# Patient Record
Sex: FEMALE | Race: BLACK OR AFRICAN AMERICAN | Employment: FULL TIME | URBAN - METROPOLITAN AREA
[De-identification: names, ages, dates, MRNs, and addresses within clinical notes are randomized per-mention and may not be internally consistent; named-entity substitution may affect disease eponyms.]

---

## 2020-12-21 ENCOUNTER — TRANSCRIBE ORDER (OUTPATIENT)
Dept: REGISTRATION | Age: 60
End: 2020-12-21

## 2020-12-21 ENCOUNTER — HOSPITAL ENCOUNTER (OUTPATIENT)
Dept: GENERAL RADIOLOGY | Age: 60
Discharge: HOME | End: 2020-12-21
Payer: COMMERCIAL

## 2020-12-21 DIAGNOSIS — M25.511 RIGHT SHOULDER PAIN: ICD-10-CM

## 2020-12-21 DIAGNOSIS — M25.511 RIGHT SHOULDER PAIN: Primary | ICD-10-CM

## 2020-12-21 PROCEDURE — 73030 X-RAY EXAM OF SHOULDER: CPT

## 2021-02-15 ENCOUNTER — HOSPITAL ENCOUNTER (OUTPATIENT)
Dept: PHYSICAL THERAPY | Age: 61
Discharge: HOME | End: 2021-02-15
Payer: COMMERCIAL

## 2021-02-15 PROCEDURE — 97110 THERAPEUTIC EXERCISES: CPT

## 2021-02-15 PROCEDURE — 97162 PT EVAL MOD COMPLEX 30 MIN: CPT

## 2021-02-15 PROCEDURE — 97140 MANUAL THERAPY 1/> REGIONS: CPT

## 2021-02-15 NOTE — PROGRESS NOTES
PT DAILY TREATMENT NOTE     Patient Name: Izaiah Corona [facility]  Date:2/15/2021  : 1960  [x]  Patient  Verified  Payor: Marilu Julien / Plan: [place] [employer] / Product Type: PPO /    In time:426pm  Out time:515pm  Total Treatment Time (min): 44  Visit #: 1 of     Medicare/BCBS Only   Total Timed Codes (min):  na 1:1 Treatment Time:  na       Treatment Area: Chronic right shoulder pain [M25.511, G89.29]  SUBJECTIVE  Pain Level (0-10 scale): 4  Any medication changes, allergies to medications, adverse drug reactions, diagnosis change, or new procedure performed?: [x] No    [] Yes (see summary sheet for update)  Subjective functional status/changes:   [] No changes reported  Pt initial eval see POC for details    OBJECTIVE    Modality rationale: decrease pain to improve the patient's ability to tolerate ADLs   Min Type Additional Details    [] Estim:  []Unatt       []IFC  []Premod                        []Other:  []w/ice   []w/heat  Position:  Location:    [] Estim: []Att    []TENS instruct  []NMES                    []Other:  []w/US   []w/ice   []w/heat  Position:  Location:    []  Traction: [] Cervical       []Lumbar                       [] Prone          []Supine                       []Intermittent   []Continuous Lbs:  [] before manual  [] after manual    []  Ultrasound: []Continuous   [] Pulsed                           []1MHz   []3MHz W/cm2:  Location:    []  Iontophoresis with dexamethasone         Location: [] Take home patch   [] In clinic   3 [x]  Ice     []  heat  []  Ice massage  []  Laser   []  Anodyne Position:supine   Location:r shoulder    []  Laser with stim  []  Other:  Position:  Location:    []  Vasopneumatic Device Pressure:       [] lo [] med [] hi   Temperature: [] lo [] med [] hi   [] Skin assessment post-treatment:  []intact []redness- no adverse reaction    []redness  adverse reaction:     18 min [x]Eval                  []Re-Eval       10 min Therapeutic Exercise:  [x] See flow sheet : Rationale: increase ROM and increase strength to improve the patient's ability to lift and reach overhead    13 min Manual Therapy:  Gr II/III inferior glide, manual stretching in all planes, stm to upper trap, deltoid, distal pecs, biceps   The manual therapy interventions were performed at a separate and distinct time from the therapeutic activities interventions. Rationale: decrease pain, increase ROM and increase tissue extensibility to improve tissue excursion during functional mobility and ADLs          With   [] TE   [] TA   [] neuro   [] other: Patient Education: [x] Review HEP    [] Progressed/Changed HEP based on:   [] positioning   [] body mechanics   [] transfers   [] heat/ice application    [] other:      Other Objective/Functional Measures:    Justification for Eval Code Complexity:  Patient History : see POC   Examination see exam   Clinical Presentation: evolving  Clinical Decision Making : FOTO : 44/100    Pain Level (0-10 scale) post treatment: 4    ASSESSMENT/Changes in Function: Pt was instructed in initial HEP required demo, vc, and tc for all exercises to perform correctly. Pt was given hand out detailing exercises and instructed in modification of exercises to tolerance, and in performing exercises safely. Pt verbalized understanding. Patient will continue to benefit from skilled PT services to modify and progress therapeutic interventions, address functional mobility deficits, address ROM deficits, address strength deficits, analyze and address soft tissue restrictions, analyze and cue movement patterns, analyze and modify body mechanics/ergonomics, assess and modify postural abnormalities, address imbalance/dizziness and instruct in home and community integration to attain remaining goals.      [x]  See Plan of Care  []  See progress note/recertification  []  See Discharge Summary         Progress towards goals / Updated goals:  No progress as goals were set today    PLAN  [] Upgrade activities as tolerated     []  Continue plan of care  []  Update interventions per flow sheet       []  Discharge due to:_  []  Other:_        Laxmi Prado 2/15/2021  12:13 PM    Future Appointments   Date Time Provider Eveline Mreida   2/15/2021  4:15 PM Gera Isbell [facility]

## 2021-02-15 NOTE — PROGRESS NOTES
[address] [facility] [address] [facility] AT [address]  Phone: [phone] Fax: [phone] / [address] [address] PHYSICAL THERAPY SERVICES  Patient Name: Garett Peguero : 1960   Medical   Diagnosis: Chronic right shoulder pain [M25.511, G89.29] Treatment Diagnosis: Chronic right shoulder pain [M25.511, G89.29]   Onset Date: 2020     Referral Source: Fransico Mcleod MD [facility]): 2/15/2021   Prior Hospitalization: See medical history Provider #: [id number]   Prior Level of Function: I in all ADLs/funcitonal mobility. Able to reach/lift overhead with no pain or restriction. Able to sleep through the night   Comorbidities: Diabetes, OA, HTN   Medications: Verified on Patient Summary List   The Plan of Care and following information is based on the information from the initial evaluation.   ========================================================================  Assessment / key information:  Pt is a 62 y.o. F who presents with R shoulder pain. Signs and sx are consistent with adhesive capsulitis. Current deficits include: significant ROM restrictions, dec functional strength, dec dynamic shoulder stability. Functional deficits include: impaired reaching/lifting, dressing, sleeping. FOTO score indicates 56% functional impairment. Home exercise program initiated on initial evaluation to address these deficits. Pt would benefit from PT to address these deficits for increased functional mobility and QOL. KYA: Pt reports that her shoulder began hurting in , she saw her MD in July and was prescribed ibuprofen, however the shoulder continued to get worse. By [facility] time this year she was not able to lift her arm any more. She had an x ray done back in December which she reports showed arthritis, and she was sent to Dr. Leann Lou who did an MRI which she reports showed adhesive capsulitis.  She also had a cortisone injection in January which did not help, tramadol helps so she takes it at night and was recommended to take it before therapy. The pain in her shoulder goes from the shoulder to the forearm. It wakes her 3-4 times per night, she is not able to reach or lift with the R arm, getting dressed is very difficult as is cooking and all other ADLs. PAIN:  Location- R shoulder  Current- 4  Best-2  Worst- 10  Alleviating factors: medication  Aggravating factors: all activity    OBJECTIVE    AROM: shoulder L 160 flex, 160 abd, PERI to T4, FIR to T10  Shoulder R 45 flex, 45 abd, PERI unable, FIR to greater trochanter.  Pain in all directions    MMT: shoulder deferred due to high pain and significant ROM restrictions    Outcome Measure: FOTO= 44    Posture: maikel elevated shoulders, mild forward head rounded shoulders, R arm held in protective adduction, IR, elbow flexion    Special Tests: unable to assume test position, defered    Accessory ROM:  Inferior glide- maximum restriction  Anterior glide-maximum restriction  Posterior glide-maximum restriction    Palpation for tenderness: TTP of deltoid, distal pectorals, infraspinatus, supraspinatus, sub acromial space, upper traps, biceps, triceps,     ========================================================================  Eval Complexity: History: HIGH Complexity :3+ comorbidities / personal factors will impact the outcome/ POC Exam:MEDIUM Complexity : 3 Standardized tests and measures addressing body structure, function, activity limitation and / or participation in recreation  Presentation: MEDIUM Complexity : Evolving with changing characteristics  Clinical Decision Making:MEDIUM Complexity : FOTO score of 26-74Overall Complexity:MEDIUM  Problem List: pain affecting function, decrease ROM, decrease strength, edema affecting function, impaired gait/ balance, decrease ADL/ functional abilitiies, decrease activity tolerance, decrease flexibility/ joint mobility and decrease transfer abilities   Treatment Plan may include any combination of the following: Therapeutic exercise, Therapeutic activities, Neuromuscular re-education, Physical agent/modality, Gait/balance training, Manual therapy, Patient education, Self Care training, Functional mobility training, Home safety training and Stair training  Patient / Family readiness to learn indicated by: asking questions  Persons(s) to be included in education: patient (P)  Barriers to Learning/Limitations: None  Measures taken:    Patient Goal (s): \"movement w/o pain\"   Patient self reported health status: fair  Rehabilitation Potential: good    Short Term Goals: To be accomplished in 1 weeks:  1) Goal: Patient will be independent and compliant with HEP in order to progress toward long term goals. Status at last note/certification: issued and reviewed  Long Term Goals: To be accomplished in 8-12 treatments:  1) Goal: Patient will improve FOTO assessment score to 63 pts in order to indicate improved functional abilities. Status at last note/certification: 44 pts  2) Goal: Patient will improve R shoulder flexion to 150 deg or better for overhead reaching  Status at last note/certification: 45 deg  3) Goal: Patient will report worst shoulder pain as 4/10 or less in order to progress toward personal goals. Status at last note/certification: 27/69  4) Goal: Patient will report overall at least 75% improvement in function in order to progress toward premorbid status.   Status at last note/certification: n/a  6) Goal: Patient will report ability to sleep through the night waking 1x or less for sufficient rest  Status at last note/certification: wakes 3-4 times per night    Frequency / Duration:   Patient to be seen  1-2  times per week for 4-6  weeks:  Patient / Caregiver education and instruction: exercises  Therapist Signature: Sandie Sprague Date: 5/37/2072   Certification Period: na Time: 12:13 PM ========================================================================  I certify that the above Physical Therapy Services are being furnished while the patient is under my care. I agree with the treatment plan and certify that this therapy is necessary. Physician Signature:        Date:       Time: ___                                            Leelee Vasquez MD.    Please sign and return to [facility] or you may fax the signed copy to [phone]. Thank you.

## 2021-02-24 ENCOUNTER — HOSPITAL ENCOUNTER (OUTPATIENT)
Dept: PHYSICAL THERAPY | Age: 61
Discharge: HOME | End: 2021-02-24
Payer: COMMERCIAL

## 2021-02-24 PROCEDURE — 97530 THERAPEUTIC ACTIVITIES: CPT

## 2021-02-24 PROCEDURE — 97140 MANUAL THERAPY 1/> REGIONS: CPT

## 2021-02-24 PROCEDURE — 97110 THERAPEUTIC EXERCISES: CPT

## 2021-02-24 NOTE — PROGRESS NOTES
PT DAILY TREATMENT NOTE     Patient Name: Zoie Cool  Date:2021  : 1960  [x]  Patient  Verified  Payor: Nichole Vaca / Plan: [place] [employer] PPO / Product Type: PPO /    In time:330p  Out time:223pm  Total Treatment Time (min): 53  Visit #: 2 of     Medicare/BCBS Only   Total Timed Codes (min):  43 1:1 Treatment Time:  43       Treatment Area: Chronic right shoulder pain [M25.511, G89.29]    SUBJECTIVE  Pain Level (0-10 scale): 6  Any medication changes, allergies to medications, adverse drug reactions, diagnosis change, or new procedure performed?: [x] No    [] Yes (see summary sheet for update)  Subjective functional status/changes:   [] No changes reported  Pt reports she has been doing her HEP, she took the last couple days off due to being sore from having the covid vaccine in that arm.      OBJECTIVE    Modality rationale: decrease inflammation and decrease pain to improve the patients ability to tolerate ADLs   Min Type Additional Details    [] Estim:  []Unatt       []IFC  []Premod                        []Other:  []w/ice   []w/heat  Position:  Location:    [] Estim: []Att    []TENS instruct  []NMES                    []Other:  []w/US   []w/ice   []w/heat  Position:  Location:    []  Traction: [] Cervical       []Lumbar                       [] Prone          []Supine                       []Intermittent   []Continuous Lbs:  [] before manual  [] after manual    []  Ultrasound: []Continuous   [] Pulsed                           []1MHz   []3MHz W/cm2:  Location:    []  Iontophoresis with dexamethasone         Location: [] Take home patch   [] In clinic   10 [x]  Ice     []  heat  []  Ice massage  []  Laser   []  Anodyne Position: supine   Location: R shoulder    []  Laser with stim  []  Other:  Position:  Location:    []  Vasopneumatic Device Pressure:       [] lo [] med [] hi   Temperature: [] lo [] med [] hi   [] Skin assessment post-treatment:  []intact []redness- no adverse reaction []redness  adverse reaction:     25 min Therapeutic Exercise:  [x] See flow sheet :   Rationale: increase ROM and increase strength to improve the patients ability to reach and lift overhead    8 min Therapeutic Activity:  [x]  See flow sheet :   Rationale: increase strength and improve coordination  to improve the patients ability to perform dressing and home care     10 min Manual Therapy:  Gr II inferior glides, gentle PROM in all planes, stm to upper trap, deltoid, infraspinatus,    The manual therapy interventions were performed at a separate and distinct time from the therapeutic activities interventions. Rationale: increase ROM and increase tissue extensibility to improve tissue excursion during functional activities           With   [] TE   [] TA   [] neuro   [] other: Patient Education: [x] Review HEP    [] Progressed/Changed HEP based on:   [] positioning   [] body mechanics   [] transfers   [] heat/ice application    [] other:      Other Objective/Functional Measures: program initiated today -1st follow up     Pain Level (0-10 scale) post treatment: 9.5    ASSESSMENT/Changes in Function: Pt required vc and demo with all new exercises to perform correctly. Pt reported pain with all exercises, and visible distress. Pt was verbally questioned to tolerance of exercises, she reported she was\"just being a baby\" and that she could continue. Pt was educated in importance of modifying exercises to tolerance to avoid exacerbating pain. Pt verbalized understanding. Pt had fair tolerance to manual therapy due to high level of pain today, PT plans to apply MHP prior to exercise NV, and try estim with ice following tx to address pain. Pt was instructed to ice/heat at home as needed to manage pain.      Patient will continue to benefit from skilled PT services to modify and progress therapeutic interventions, address functional mobility deficits, address ROM deficits, address strength deficits, analyze and address soft tissue restrictions, analyze and cue movement patterns, analyze and modify body mechanics/ergonomics, assess and modify postural abnormalities, address imbalance/dizziness and instruct in home and community integration to attain remaining goals. []  See Plan of Care  []  See progress note/recertification  []  See Discharge Summary         Progress towards goals / Updated goals:     Short Term Goals: To be accomplished in 1 weeks:  1) Goal: Patient will be independent and compliant with HEP in order to progress toward long term goals. Status at last note/certification: issued and reviewed Progressing, pt has performed 1x at home 2/24/21  Long Term Goals: To be accomplished in 8-12 treatments:  1) Goal: Patient will improve FOTO assessment score to 63 pts in order to indicate improved functional abilities. Status at last note/certification: 44 pts  2) Goal: Patient will improve R shoulder flexion to 150 deg or better for overhead reaching  Status at last note/certification: 45 deg  3) Goal: Patient will report worst shoulder pain as 4/10 or less in order to progress toward personal goals. Status at last note/certification: 94/97  4) Goal: Patient will report overall at least 75% improvement in function in order to progress toward premorbid status.   Status at last note/certification: n/a  6) Goal: Patient will report ability to sleep through the night waking 1x or less for sufficient rest  Status at last note/certification: wakes 3-4 times per night       PLAN  []  Upgrade activities as tolerated     []  Continue plan of care  []  Update interventions per flow sheet       []  Discharge due to:_  []  Other:_      Panfilo Favorite 2/24/2021  9:51 AM    Future Appointments   Date Time Provider Eveline Merida   2/24/2021  3:30 PM Barbara Roberts [facility]   2/25/2021  4:15 PM [facility]   3/2/2021  5:45 PM [facility]   3/4/2021  4:15 PM [facility]   3/9/2021  5:45 PM [facility] PT [facility] [address]   3/11/2021  4:15 PM [address] PT [facility] [address]   3/16/2021  4:15 PM [address] PT [facility] [address]   3/18/2021  4:15 PM [address] PT [facility] [address]

## 2021-02-25 ENCOUNTER — HOSPITAL ENCOUNTER (OUTPATIENT)
Dept: PHYSICAL THERAPY | Age: 61
Discharge: HOME | End: 2021-02-25
Payer: COMMERCIAL

## 2021-02-25 PROCEDURE — 97110 THERAPEUTIC EXERCISES: CPT | Performed by: GENERAL ACUTE CARE HOSPITAL

## 2021-02-25 PROCEDURE — 97014 ELECTRIC STIMULATION THERAPY: CPT | Performed by: GENERAL ACUTE CARE HOSPITAL

## 2021-02-25 PROCEDURE — 97140 MANUAL THERAPY 1/> REGIONS: CPT | Performed by: GENERAL ACUTE CARE HOSPITAL

## 2021-02-25 PROCEDURE — 97530 THERAPEUTIC ACTIVITIES: CPT | Performed by: GENERAL ACUTE CARE HOSPITAL

## 2021-02-25 NOTE — PROGRESS NOTES
PT DAILY TREATMENT NOTE     Patient Name: Laura Haines [facility]  Date:2021  : 1960  [x]  Patient  Verified  Payor: Nichole Vaca / Plan: [place] [employer] PPO / Product Type: PPO /    In time: 4:16  Out time: 5:25  Total Treatment Time (min): 69  Visit #: 3 of     Medicare/BCBS Only   Total Timed Codes (min):  53 1:1 Treatment Time:  53       Treatment Area: Chronic right shoulder pain [M25.511, G89.29]    SUBJECTIVE  Pain Level (0-10 scale): 8/10  Any medication changes, allergies to medications, adverse drug reactions, diagnosis change, or new procedure performed?: [x] No    [] Yes (see summary sheet for update)  Subjective functional status/changes:   [] No changes reported   Pt reports increased pain today. \"I was just here yesterday, so I don't think my shoulder has had time to recover. \"     OBJECTIVE    Modality rationale: decrease inflammation and decrease pain to improve the patients ability to tolerate ADLs   Min Type Additional Details   10 [x] Estim:  [x]Unatt       [x]IFC  []Premod                        []Other:  [x]w/ice   []w/heat  Position: semi reclined  Location: R shoulder     [] Estim: []Att    []TENS instruct  []NMES                    []Other:  []w/US   []w/ice   []w/heat  Position:  Location:    []  Traction: [] Cervical       []Lumbar                       [] Prone          []Supine                       []Intermittent   []Continuous Lbs:  [] before manual  [] after manual    []  Ultrasound: []Continuous   [] Pulsed                           []1MHz   []3MHz W/cm2:  Location:    []  Iontophoresis with dexamethasone         Location: [] Take home patch   [] In clinic   6- pre session []  Ice     [x]  heat  []  Ice massage  []  Laser   []  Anodyne Position: seated  Location: R shoulder    []  Laser with stim  []  Other:  Position:  Location:    []  Vasopneumatic Device Pressure:       [] lo [] med [] hi   Temperature: [] lo [] med [] hi   [x] Skin assessment post-treatment:  [x]intact []redness- no adverse reaction    []redness  adverse reaction:     30 min Therapeutic Exercise:  [x] See flow sheet :   Rationale: increase ROM and increase strength to improve the patients ability to reach and lift overhead    8 min Therapeutic Activity:  [x]  See flow sheet :   Rationale: increase strength and improve coordination  to improve the patients ability to perform dressing and home care     15 min Manual Therapy:  Gr II-III inferior glides, gentle PROM in all planes, stm to upper trap, deltoid, infraspinatus   The manual therapy interventions were performed at a separate and distinct time from the therapeutic activities interventions. Rationale: increase ROM and increase tissue extensibility to improve tissue excursion during functional activities           With   [] TE   [] TA   [] neuro   [] other: Patient Education: [x] Review HEP    [] Progressed/Changed HEP based on:   [] positioning   [] body mechanics   [] transfers   [] heat/ice application    [] other:      Other Objective/Functional Measures:   AAROM shoulder flexion @ finger ladder: 115 deg    Added shoulder isometrics and pendulums    Pain Level (0-10 scale) post treatment: 0/10 at rest     ASSESSMENT/Changes in Function: Trial of heat pre exercise with good response for improved tolerance to AAROM exercises. Patient demonstrated improved understanding and implementation of completing exercises in a relatively pain free range, or at least avoiding significant exacerbation. Good tolerance to addition of new exercises today without provoking pain. Continues to demo very limited shoulder ROM in capsular pattern. Appeared to have improved tolerance with manual therapy with slowed, controlled motions. Will continue to progress as tolerated. Plan to update HEP NV pending tolerance.        Patient will continue to benefit from skilled PT services to modify and progress therapeutic interventions, address functional mobility deficits, address ROM deficits, address strength deficits, analyze and address soft tissue restrictions, analyze and cue movement patterns, analyze and modify body mechanics/ergonomics, assess and modify postural abnormalities, address imbalance/dizziness and instruct in home and community integration to attain remaining goals. [x]  See Plan of Care  []  See progress note/recertification  []  See Discharge Summary         Progress towards goals / Updated goals:     Short Term Goals: To be accomplished in 1 weeks:  1) Goal: Patient will be independent and compliant with HEP in order to progress toward long term goals. Status at last note/certification: issued and reviewed Progressing, pt has performed 1x at home 2/24/21  Long Term Goals: To be accomplished in 8-12 treatments:  1) Goal: Patient will improve FOTO assessment score to 63 pts in order to indicate improved functional abilities. Status at last note/certification: 44 pts  2) Goal: Patient will improve R shoulder flexion to 150 deg or better for overhead reaching  Status at last note/certification: 45 deg  3) Goal: Patient will report worst shoulder pain as 4/10 or less in order to progress toward personal goals. Status at last note/certification: 25/35  4) Goal: Patient will report overall at least 75% improvement in function in order to progress toward premorbid status.   Status at last note/certification: n/a  6) Goal: Patient will report ability to sleep through the night waking 1x or less for sufficient rest  Status at last note/certification: wakes 3-4 times per night       PLAN  []  Upgrade activities as tolerated     []  Continue plan of care  []  Update interventions per flow sheet       []  Discharge due to:_  []  Other:_       Wilfred Velazquez, PT 2/25/2021  9:51 AM    Future Appointments   Date Time Provider Eveline Merida   2/25/2021  4:15 PM [facility]   3/2/2021  5:45 PM [facility] PT [facility]   3/4/2021  4:15 PM [facility] PT [facility] 3/9/2021  5:45 PM [facility] PT [facility]   3/11/2021  4:15 PM [facility] PT [facility]   3/16/2021  4:15 PM [facility] PT [facility]   3/18/2021  4:15 PM [facility] PT [facility]

## 2021-03-02 ENCOUNTER — HOSPITAL ENCOUNTER (OUTPATIENT)
Dept: PHYSICAL THERAPY | Age: 61
Discharge: HOME | End: 2021-03-02
Payer: COMMERCIAL

## 2021-03-02 PROCEDURE — 97140 MANUAL THERAPY 1/> REGIONS: CPT | Performed by: GENERAL ACUTE CARE HOSPITAL

## 2021-03-02 PROCEDURE — 97110 THERAPEUTIC EXERCISES: CPT | Performed by: GENERAL ACUTE CARE HOSPITAL

## 2021-03-02 PROCEDURE — 97014 ELECTRIC STIMULATION THERAPY: CPT | Performed by: GENERAL ACUTE CARE HOSPITAL

## 2021-03-02 PROCEDURE — 97530 THERAPEUTIC ACTIVITIES: CPT | Performed by: GENERAL ACUTE CARE HOSPITAL

## 2021-03-02 NOTE — PROGRESS NOTES
PT DAILY TREATMENT NOTE     Patient Name: Keshav Began  Date:3/2/2021  : 1960  [x]  Patient  Verified  Payor: Paige Clemente / Plan: [place] [employer] / Product Type: PPO /    In time: 545  Out time:  655  Total Treatment Time (min): 70  Visit #: 4 of     Medicare/BCBS Only   Total Timed Codes (min):  60 1:1 Treatment Time:  60       Treatment Area: Chronic right shoulder pain [M25.511, G89.29]    SUBJECTIVE  Pain Level (0-10 scale): 0/10 at rest, 4/10 with movement   Any medication changes, allergies to medications, adverse drug reactions, diagnosis change, or new procedure performed?: [x] No    [] Yes (see summary sheet for update)  Subjective functional status/changes:   [] No changes reported   Pt reports moderate soreness after last session. States the ice and estim helped manage her pain. Continues to have pain with certain, random movements and with eccentric control.      OBJECTIVE    Modality rationale: decrease inflammation and decrease pain to improve the patients ability to tolerate ADLs   Min Type Additional Details   10 [x] Estim:  [x]Unatt       [x]IFC  []Premod                        []Other:  [x]w/ice   []w/heat  Position: semi reclined  Location: R shoulder     [] Estim: []Att    []TENS instruct  []NMES                    []Other:  []w/US   []w/ice   []w/heat  Position:  Location:    []  Traction: [] Cervical       []Lumbar                       [] Prone          []Supine                       []Intermittent   []Continuous Lbs:  [] before manual  [] after manual    []  Ultrasound: []Continuous   [] Pulsed                           []1MHz   []3MHz W/cm2:  Location:    []  Iontophoresis with dexamethasone         Location: [] Take home patch   [] In clinic    []  Ice     [x]  heat  []  Ice massage  []  Laser   []  Anodyne Position: seated  Location: R shoulder    []  Laser with stim  []  Other:  Position:  Location:    []  Vasopneumatic Device Pressure:       [] lo [] med [] hi Temperature: [] lo [] med [] hi   [x] Skin assessment post-treatment:  [x]intact []redness- no adverse reaction    []redness  adverse reaction:     35 min Therapeutic Exercise:  [x] See flow sheet :   Rationale: increase ROM and increase strength to improve the patients ability to reach and lift overhead    10 min Therapeutic Activity:  [x]  See flow sheet :   Rationale: increase strength and improve coordination  to improve the patients ability to perform dressing and home care     15 min Manual Therapy:  Gr II-III inferior glides, gentle PROM in all planes, stm to upper trap, deltoid, infraspinatus  Sidelying scap mobs, shoulder extension PROM. The manual therapy interventions were performed at a separate and distinct time from the therapeutic activities interventions. Rationale: increase ROM and increase tissue extensibility to improve tissue excursion during functional activities           With   [] TE   [] TA   [] neuro   [] other: Patient Education: [x] Review HEP    [] Progressed/Changed HEP based on:   [] positioning   [] body mechanics   [] transfers   [] heat/ice application    [] other:   Updated HEP - see chart. Other Objective/Functional Measures:     Pain Level (0-10 scale) post treatment: 0/10 at rest     ASSESSMENT/Changes in Function: Did not significantly progression therex program today secondary to increased soreness after last session. Did not perform MHP prior to session (tech was unaware), but will plan to continue this in future session as patient does report improved tolerance with moist heat prior to mobilization. Provided with updated HEP to reflect AAROM. Plan to continue to progress as tolerated. Firm end feel appreciated in al planes with pain at end range.        Patient will continue to benefit from skilled PT services to modify and progress therapeutic interventions, address functional mobility deficits, address ROM deficits, address strength deficits, analyze and address soft tissue restrictions, analyze and cue movement patterns, analyze and modify body mechanics/ergonomics, assess and modify postural abnormalities, address imbalance/dizziness and instruct in home and community integration to attain remaining goals. [x]  See Plan of Care  []  See progress note/recertification  []  See Discharge Summary         Progress towards goals / Updated goals:     Short Term Goals: To be accomplished in 1 weeks:  1) Goal: Patient will be independent and compliant with HEP in order to progress toward long term goals. Status at last note/certification: issued and reviewed Progressing, pt has performed 1x at home 2/24/21  Long Term Goals: To be accomplished in 8-12 treatments:  1) Goal: Patient will improve FOTO assessment score to 63 pts in order to indicate improved functional abilities. Status at last note/certification: 44 pts  2) Goal: Patient will improve R shoulder flexion to 150 deg or better for overhead reaching  Status at last note/certification: 45 deg  3) Goal: Patient will report worst shoulder pain as 4/10 or less in order to progress toward personal goals. Status at last note/certification: 56/27  4) Goal: Patient will report overall at least 75% improvement in function in order to progress toward premorbid status. Status at last note/certification: n/a  6) Goal: Patient will report ability to sleep through the night waking 1x or less for sufficient rest  Status at last note/certification: wakes 3-4 times per night       PLAN  []  Upgrade activities as tolerated     []  Continue plan of care  []  Update interventions per flow sheet       []  Discharge due to:_  []  Other:_   Take AAROM measurements NV - update goals.      Yobany Giraldo PT 3/2/2021  9:51 AM    Future Appointments   Date Time Provider Eveline Merida   3/2/2021  5:45 PM [facility] [place] [facility]   3/4/2021  4:15 PM [facility] [place] [facility]   3/9/2021  5:45 PM [facility] [place] [facility]   3/11/2021 4:15 PM [facility] PT [place] [facility]   3/16/2021  4:15 PM [facility] PT [place] [facility]   3/18/2021  4:15 PM [facility] PT [place] 2 [facility]

## 2021-03-04 ENCOUNTER — HOSPITAL ENCOUNTER (OUTPATIENT)
Dept: PHYSICAL THERAPY | Age: 61
End: 2021-03-04
Payer: COMMERCIAL

## 2021-03-04 ENCOUNTER — HOSPITAL ENCOUNTER (EMERGENCY)
Age: 61
Discharge: HOME | End: 2021-03-04
Attending: EMERGENCY MEDICINE
Payer: COMMERCIAL

## 2021-03-04 VITALS
DIASTOLIC BLOOD PRESSURE: 79 MMHG | HEART RATE: 93 BPM | SYSTOLIC BLOOD PRESSURE: 129 MMHG | RESPIRATION RATE: 18 BRPM | OXYGEN SATURATION: 100 % | TEMPERATURE: 97.6 F

## 2021-03-04 DIAGNOSIS — H81.10 BENIGN PAROXYSMAL POSITIONAL VERTIGO, UNSPECIFIED LATERALITY: Primary | ICD-10-CM

## 2021-03-04 LAB
ALBUMIN SERPL-MCNC: 3.8 G/DL (ref 3.4–5)
ALBUMIN/GLOB SERPL: 1 {RATIO} (ref 0.8–1.7)
ALP SERPL-CCNC: 93 U/L (ref 45–117)
ALT SERPL-CCNC: 33 U/L (ref 13–56)
ANION GAP SERPL CALC-SCNC: 2 MMOL/L (ref 3–18)
APPEARANCE UR: CLEAR
AST SERPL-CCNC: 22 U/L (ref 10–38)
BASOPHILS # BLD: 0 K/UL (ref 0–0.1)
BASOPHILS NFR BLD: 0 % (ref 0–2)
BILIRUB SERPL-MCNC: 0.3 MG/DL (ref 0.2–1)
BILIRUB UR QL: NEGATIVE
BNP SERPL-MCNC: 19 PG/ML (ref 0–900)
BUN SERPL-MCNC: 13 MG/DL (ref 7–18)
BUN/CREAT SERPL: 15 (ref 12–20)
CALCIUM SERPL-MCNC: 9.9 MG/DL (ref 8.5–10.1)
CHLORIDE SERPL-SCNC: 103 MMOL/L (ref 100–111)
CO2 SERPL-SCNC: 34 MMOL/L (ref 21–32)
COLOR UR: YELLOW
CREAT SERPL-MCNC: 0.86 MG/DL (ref 0.6–1.3)
DIFFERENTIAL METHOD BLD: ABNORMAL
EOSINOPHIL # BLD: 0.2 K/UL (ref 0–0.4)
EOSINOPHIL NFR BLD: 3 % (ref 0–5)
ERYTHROCYTE [DISTWIDTH] IN BLOOD BY AUTOMATED COUNT: 15.4 % (ref 11.6–14.5)
GLOBULIN SER CALC-MCNC: 3.7 G/DL (ref 2–4)
GLUCOSE SERPL-MCNC: 133 MG/DL (ref 74–99)
GLUCOSE UR STRIP.AUTO-MCNC: 100 MG/DL
HCT VFR BLD AUTO: 42.2 % (ref 35–45)
HGB BLD-MCNC: 13.2 G/DL (ref 12–16)
HGB UR QL STRIP: NEGATIVE
KETONES UR QL STRIP.AUTO: NEGATIVE MG/DL
LEUKOCYTE ESTERASE UR QL STRIP.AUTO: NEGATIVE
LYMPHOCYTES # BLD: 3.6 K/UL (ref 0.9–3.6)
LYMPHOCYTES NFR BLD: 43 % (ref 21–52)
MAGNESIUM SERPL-MCNC: 2.1 MG/DL (ref 1.6–2.6)
MCH RBC QN AUTO: 28 PG (ref 24–34)
MCHC RBC AUTO-ENTMCNC: 31.3 G/DL (ref 31–37)
MCV RBC AUTO: 89.4 FL (ref 74–97)
MONOCYTES # BLD: 0.5 K/UL (ref 0.05–1.2)
MONOCYTES NFR BLD: 6 % (ref 3–10)
NEUTS SEG # BLD: 4 K/UL (ref 1.8–8)
NEUTS SEG NFR BLD: 48 % (ref 40–73)
NITRITE UR QL STRIP.AUTO: NEGATIVE
PH UR STRIP: 6.5 [PH] (ref 5–8)
PLATELET # BLD AUTO: 284 K/UL (ref 135–420)
PMV BLD AUTO: 12 FL (ref 9.2–11.8)
POTASSIUM SERPL-SCNC: 3.8 MMOL/L (ref 3.5–5.5)
PROT SERPL-MCNC: 7.5 G/DL (ref 6.4–8.2)
PROT UR STRIP-MCNC: NEGATIVE MG/DL
RBC # BLD AUTO: 4.72 M/UL (ref 4.2–5.3)
SODIUM SERPL-SCNC: 139 MMOL/L (ref 136–145)
SP GR UR REFRACTOMETRY: 1.02 (ref 1–1.03)
TROPONIN I SERPL-MCNC: <0.02 NG/ML (ref 0–0.04)
UROBILINOGEN UR QL STRIP.AUTO: 0.2 EU/DL (ref 0.2–1)
WBC # BLD AUTO: 8.4 K/UL (ref 4.6–13.2)

## 2021-03-04 PROCEDURE — 80053 COMPREHEN METABOLIC PANEL: CPT

## 2021-03-04 PROCEDURE — 93005 ELECTROCARDIOGRAM TRACING: CPT

## 2021-03-04 PROCEDURE — 74011250636 HC RX REV CODE- 250/636: Performed by: EMERGENCY MEDICINE

## 2021-03-04 PROCEDURE — 85025 COMPLETE CBC W/AUTO DIFF WBC: CPT

## 2021-03-04 PROCEDURE — 83880 ASSAY OF NATRIURETIC PEPTIDE: CPT

## 2021-03-04 PROCEDURE — 99285 EMERGENCY DEPT VISIT HI MDM: CPT

## 2021-03-04 PROCEDURE — 84484 ASSAY OF TROPONIN QUANT: CPT

## 2021-03-04 PROCEDURE — 83735 ASSAY OF MAGNESIUM: CPT

## 2021-03-04 PROCEDURE — 81003 URINALYSIS AUTO W/O SCOPE: CPT

## 2021-03-04 RX ORDER — MECLIZINE HYDROCHLORIDE 25 MG/1
TABLET ORAL
Qty: 20 TAB | Refills: 0 | Status: SHIPPED | OUTPATIENT
Start: 2021-03-04

## 2021-03-04 RX ORDER — MECLIZINE HCL 12.5 MG 12.5 MG/1
50 TABLET ORAL
Status: COMPLETED | OUTPATIENT
Start: 2021-03-04 | End: 2021-03-04

## 2021-03-04 RX ORDER — MECLIZINE HYDROCHLORIDE 25 MG/1
TABLET ORAL
Qty: 20 TAB | Refills: 0 | Status: SHIPPED | OUTPATIENT
Start: 2021-03-04 | End: 2021-03-04 | Stop reason: SDUPTHER

## 2021-03-04 RX ADMIN — MECLIZINE 50 MG: 12.5 TABLET ORAL at 21:38

## 2021-03-04 NOTE — ED TRIAGE NOTES
This morning making bed and the room spun out   Laid on the bed for while  Felt light headed after that   Thought BS was low but it was 128. Took a naproxyn yesterday for frozen shoulder and started to become light headed yesterday too. Dizzy in shower.    States it is worse when she lays down

## 2021-03-05 LAB
ATRIAL RATE: 82 BPM
CALCULATED P AXIS, ECG09: 63 DEGREES
CALCULATED R AXIS, ECG10: -17 DEGREES
CALCULATED T AXIS, ECG11: 62 DEGREES
DIAGNOSIS, 93000: NORMAL
P-R INTERVAL, ECG05: 150 MS
Q-T INTERVAL, ECG07: 354 MS
QRS DURATION, ECG06: 78 MS
QTC CALCULATION (BEZET), ECG08: 413 MS
VENTRICULAR RATE, ECG03: 82 BPM

## 2021-03-05 NOTE — ED PROVIDER NOTES
EMERGENCY DEPARTMENT HISTORY AND PHYSICAL EXAM      Date: 3/4/2021  Patient Name: Milagros Hilton    History of Presenting Illness     Chief Complaint   Patient presents with    Dizziness       History (Context): Milagros Hilton is a 62 y.o. Naomi Breslow with a past medical history as noted below, who presents unexplained sudden onset, severe, waxing and waning, dizziness that started 2 days ago. This sensation feels like room spinning. This has caused gait instability. On review of systems, the patient denies fever, chills, recent trauma trauma, neck pain, chest pain, back pain, abdominal pain, vomiting, diaphoresis, seizure-like activity, numbness, weakness, tingling. Patient endorses associated nausea. PCP: Lisa Schwartz NP        Past History     Past Medical History:  History reviewed. No pertinent past medical history. Past Surgical History:  History reviewed. No pertinent surgical history. Family History:  History reviewed. No pertinent family history. Social History:  Social History     Tobacco Use    Smoking status: Not on file   Substance Use Topics    Alcohol use: Not on file    Drug use: Not on file       Allergies:  No Known Allergies    PMH, PSH, family history, social history, allergies reviewed with the patient with significant items noted above. Review of Systems   As per HPI, otherwise reviewed and negative. Physical Exam     Vitals:    03/04/21 2015 03/04/21 2030 03/04/21 2045 03/04/21 2100   BP: 132/82 138/83 134/84 129/79   Pulse:       Resp:       Temp:       SpO2: 95% 93% 98% 100%       Gen: Well-appearing, in no acute distress  HEENT: Normocephalic, sclera anicteric  Cardiovascular: Normal rate, regular rhythm, no murmurs, rubs, gallops. Pulses intact and equal distally. Pulmonary: No respiratory distress. No stridor. Clear lungs. ABD: Soft, nontender, nondistended. Neuro: Alert. Normal speech. Normal mentation. No dysarthria.   No resting nystagmus. Nystagmus with left gaze. HINTS exam suggestive of peripherall. Cerebellar exam is normal: Rapid alternating motions, finger-nose-finger, heel shin. Gait steady. Psych: Normal thought content and thought processes. : No CVA tenderness  EXT: No swelling. Moves all extremities well. No cyanosis or clubbing. Skin: Warm and well-perfused. Diagnostic Study Results     Labs -     No results found for this or any previous visit (from the past 12 hour(s)). Radiologic Studies -   No orders to display     CT Results  (Last 48 hours)    None        CXR Results  (Last 48 hours)    None            Medical Decision Making   I am the first provider for this patient. I reviewed the vital signs, available nursing notes, past medical history, past surgical history, family history and social history. Vital Signs-Reviewed the patient's vital signs. EKG: Interpreted by myself, as noted below in ED course. No evidence of long/short QT, short WA interval, WPW, high degree heart block, ARVD, HOCM, Brugada syndrome, frequent PVCs. Records Reviewed: Personally, on initial evaluation    MDM:   Patient presents with vertigo. Exam significant for suggestive of peripheral.  No cerebellar signs. HINTS exam suggestive of peripheral etiology. Symptoms are not constant and are exacerbated by positioning.   DDX considered: BPPV  DDX thought to be less likely but also considered due to high risk condition: Stroke, Arrhythmia, vasovagal, orthostatic hypotension, other cause of syncope (electrolyte abnormality, anemia), anxiety    Patient condition on initial evaluation: Stable    Plan:   Close Observation  As per orders noted below:  Orders Placed This Encounter    CBC WITH AUTOMATED DIFF    COMPREHENSIVE METABOLIC PANEL    MAGNESIUM    URINALYSIS W/ RFLX MICROSCOPIC    TROPONIN I    PRO-BNP    EKG, 12 LEAD, INITIAL    DISCONTD: meclizine (ANTIVERT) 25 mg tablet    meclizine (ANTIVERT) tablet 50 mg    meclizine (ANTIVERT) 25 mg tablet        ED Course:      Patient improved significantly with the administration of meclizine. Patient ambulated to the bathroom without issue. Patient condition at time of disposition: Stable  DISCHARGE NOTE:   Pt has been reexamined. Patient has no new complaints, changes, or physical findings. Care plan outlined and precautions discussed. Results were reviewed with the patient. All medications were reviewed with the patient; will d/c home with meclizine. All of pt's questions and concerns were addressed. Alarm symptoms and return precautions associated with chief complaint and evaluation were reviewed with the patient in detail. The patient demonstrated adequate understanding. Patient was instructed and agrees to follow up with ENT as necessary, as well as to return to the ED upon further deterioration. Patient is ready to go home. The patient is happy with this plan    Follow-up Information     Follow up With Specialties Details Why Contact Ann-Marie Torrez NP Nurse Practitioner Go to  As needed, If symptoms worsen [address] EMERGENCY DEPT Emergency Medicine Go to  As needed, If symptoms worsen [address] [place] [address]    Martir Lawton MD Otolaryngology, Surgery Schedule an appointment as soon as possible for a visit  As needed, If symptoms worsen [address]  [phone]            Discharge Medication List as of 3/4/2021  9:34 PM      START taking these medications    Details   meclizine (ANTIVERT) 25 mg tablet Take 1 tablet by mouth every 6 hours for the next 3 days. Then stop taking the meclizine. Restart the meclizine for 3 day intervals if vertigo/ dizziness returns. , Print, Disp-20 Tab, R-0                   Diagnosis     Clinical Impression:   1.  Benign paroxysmal positional vertigo, unspecified laterality Hyacinth Torres MD  Emergency Physician  [facility]    As a voice dictation software was utilized to dictate this note, minor word transpositions can occur. I apologize for confusing wording and typographic errors. Please feel free to contact me for clarification.

## 2021-03-05 NOTE — ED NOTES
Patient has been provided discharge instructions. My Chart and [facility] has been discussed, and if any prescriptions were provided or called in, they have been reviewed with the patient, as well. Allowed time for questions and clarification. Meclizine given prior to her leaving.

## 2021-03-05 NOTE — ED NOTES
Met patient while she was sitting up in bed. No medications taken today except for BP meds this am.  States she took Naproxen yesterday areound 1200, which was ordered for her right frozen shoulder. By  she started to get dizzy.     Had [facility] shot 2/17    Was going to do orthostatics on her but when I layed her flat she stated she was feeling like she was going to fall out of bed, once I raised the hod of bed she was better

## 2021-03-09 ENCOUNTER — HOSPITAL ENCOUNTER (OUTPATIENT)
Dept: PHYSICAL THERAPY | Age: 61
Discharge: HOME | End: 2021-03-09
Payer: COMMERCIAL

## 2021-03-09 PROCEDURE — 97140 MANUAL THERAPY 1/> REGIONS: CPT | Performed by: GENERAL ACUTE CARE HOSPITAL

## 2021-03-09 PROCEDURE — 97530 THERAPEUTIC ACTIVITIES: CPT | Performed by: GENERAL ACUTE CARE HOSPITAL

## 2021-03-09 PROCEDURE — 97014 ELECTRIC STIMULATION THERAPY: CPT | Performed by: GENERAL ACUTE CARE HOSPITAL

## 2021-03-09 PROCEDURE — 97110 THERAPEUTIC EXERCISES: CPT | Performed by: GENERAL ACUTE CARE HOSPITAL

## 2021-03-09 NOTE — PROGRESS NOTES
PT DAILY TREATMENT NOTE     Patient Name: Derick Ocampo  Date:3/9/2021  : 1960  [x]  Patient  Verified  Payor: Renay Lipoma / [place] [employer] [place] / Product Type: PPO /    In time:  5:45  Out time:   7:02  Total Treatment Time (min): 77  Visit #: 5 of     Medicare/BCBS Only   Total Timed Codes (min):  61 1:1 Treatment Time:  61       Treatment Area: Chronic right shoulder pain [M25.511, G89.29]    SUBJECTIVE  Pain Level (0-10 scale): 510  Any medication changes, allergies to medications, adverse drug reactions, diagnosis change, or new procedure performed?: [x] No    [] Yes (see summary sheet for update)  Subjective functional status/changes:   [] No changes reported   Pt reports having a bout of vertigo last week possibly secondary to medication. Pt states she recently began taking Naproxen, which is the only new addition to her routine. Went to ER whom prescribed Meclozine. Pt reports feeling better the past few days. Has an appt with ENT at end of the month.      OBJECTIVE    Modality rationale: decrease inflammation and decrease pain to improve the patients ability to tolerate ADLs   Min Type Additional Details   10 [x] Estim:  [x]Unatt       [x]IFC  []Premod                        []Other:  [x]w/ice   []w/heat  Position: semi reclined  Location: R shoulder     [] Estim: []Att    []TENS instruct  []NMES                    []Other:  []w/US   []w/ice   []w/heat  Position:  Location:    []  Traction: [] Cervical       []Lumbar                       [] Prone          []Supine                       []Intermittent   []Continuous Lbs:  [] before manual  [] after manual    []  Ultrasound: []Continuous   [] Pulsed                           []1MHz   []3MHz W/cm2:  Location:    []  Iontophoresis with dexamethasone         Location: [] Take home patch   [] In clinic   6 -pre session []  Ice     [x]  heat  []  Ice massage  []  Laser   []  Anodyne Position: semi reclined  Location: R shoulder    []  Laser with stim  []  Other:  Position:  Location:    []  Vasopneumatic Device Pressure:       [] lo [] med [] hi   Temperature: [] lo [] med [] hi   [x] Skin assessment post-treatment:  [x]intact []redness- no adverse reaction    []redness  adverse reaction:     39 min Therapeutic Exercise:  [x] See flow sheet :   Rationale: increase ROM and increase strength to improve the patients ability to reach and lift overhead    12 min Therapeutic Activity:  [x]  See flow sheet :   Rationale: increase strength and improve coordination  to improve the patients ability to perform dressing and home care     10 min Manual Therapy:  Gr II-III inferior glides, gentle PROM in all planes, stm to upper trap, deltoid, infraspinatus  Sidelying scap mobs, shoulder extension PROM. The manual therapy interventions were performed at a separate and distinct time from the therapeutic activities interventions. Rationale: increase ROM and increase tissue extensibility to improve tissue excursion during functional activities           With   [x] TE   [] TA   [] neuro   [] other: Patient Education: [x] Review HEP    [] Progressed/Changed HEP based on:   [] positioning   [] body mechanics   [] transfers   [] heat/ice application    [] other:     Educated pt regarding recent vertigo episode with potential causes including BPPV. Educated about symptoms associated with BPPV specifically as her symptoms are correlated with positional head movements (bending and looking down, rolling onto her side, tipping head back to wash hair in shower), causing \"spinning\" sensation. Advised to see PCP or ENT sooner if symptoms continue, or possibly get an additional referral to PT for treatment of vertigo. Other Objective/Functional Measures: Added standing ER with YTB in available range (attempted IR but p!)  Added ER doorway stretch   HOLD pendulums - head down position began to cause vertigo flare up.    (R) shoulder AROM: flex 100, abd 73, FIR lateral glut AAROM flex 120 on finger ladder    Pain Level (0-10 scale) post treatment: 0/10 at rest      ASSESSMENT/Changes in Function: Improved tolerance to session with MHP pre-exercises. Able to tolerate resisted ER today in limited range- VC/TC for correct form to prevent elbow extension compensation. Has pain with eccentric lowering. Noted elevated pain levels today as patient is no longer taking pain medication secondary to recent vertiginous episode. Continue to progress R shoulder A/AAROM as tolerated, making progress as noted by LTG#2. Patient will continue to benefit from skilled PT services to modify and progress therapeutic interventions, address functional mobility deficits, address ROM deficits, address strength deficits, analyze and address soft tissue restrictions, analyze and cue movement patterns, analyze and modify body mechanics/ergonomics, assess and modify postural abnormalities, address imbalance/dizziness and instruct in home and community integration to attain remaining goals. [x]  See Plan of Care  []  See progress note/recertification  []  See Discharge Summary         Progress towards goals / Updated goals:     Short Term Goals: To be accomplished in 1 weeks:  1) Goal: Patient will be independent and compliant with HEP in order to progress toward long term goals. Status at last note/certification: issued and reviewed Progressing, pt has performed 1x at home 2/24/21  Long Term Goals: To be accomplished in 8-12 treatments:  1) Goal: Patient will improve FOTO assessment score to 63 pts in order to indicate improved functional abilities.   Status at last note/certification: 44 pts  2) Goal: Patient will improve R shoulder flexion to 150 deg or better for overhead reaching  Status at last note/certification: 45 deg  Current: progressing - R shoulder flexion 100 degrees (3/9/2021)  3) Goal: Patient will report worst shoulder pain as 4/10 or less in order to progress toward personal goals.  Status at last note/certification: 48/21  4) Goal: Patient will report overall at least 75% improvement in function in order to progress toward premorbid status.   Status at last note/certification: n/a  6) Goal: Patient will report ability to sleep through the night waking 1x or less for sufficient rest  Status at last note/certification: wakes 3-4 times per night  Current: Continues to have frequent pain at night (3/9/2021)       PLAN  []  Upgrade activities as tolerated     []  Continue plan of care  []  Update interventions per flow sheet       []  Discharge due to:_  []  Other:_       Jocelin Zhu, PT 3/9/2021  9:51 AM    Future Appointments   Date Time Provider Eveline Merida   3/9/2021  5:45 PM [facility] [place] [facility]   3/11/2021  4:15 PM [facility] [place] [facility]   3/16/2021  4:15 PM [facility] [place] [facility]   3/18/2021  4:15 PM [facility] [place] [facility]

## 2021-03-11 ENCOUNTER — HOSPITAL ENCOUNTER (OUTPATIENT)
Dept: PHYSICAL THERAPY | Age: 61
Discharge: HOME | End: 2021-03-11
Payer: COMMERCIAL

## 2021-03-11 PROCEDURE — 97140 MANUAL THERAPY 1/> REGIONS: CPT | Performed by: PHYSICAL THERAPIST

## 2021-03-11 PROCEDURE — 97014 ELECTRIC STIMULATION THERAPY: CPT | Performed by: PHYSICAL THERAPIST

## 2021-03-11 PROCEDURE — 97110 THERAPEUTIC EXERCISES: CPT | Performed by: PHYSICAL THERAPIST

## 2021-03-11 PROCEDURE — 97530 THERAPEUTIC ACTIVITIES: CPT | Performed by: PHYSICAL THERAPIST

## 2021-03-11 NOTE — PROGRESS NOTES
PT DAILY TREATMENT NOTE     Patient Name: German Shanks  GZTX:[id number]  : 1960  [x]  Patient  Verified  Payor: Jose Uman[place] / [place] / Product Type: PPO /    In time:  417pm  Out time:   541  Total Treatment Time (min):84  Visit #: 6 of     Medicare/BCBS Only   Total Timed Codes (min):  63 1:1 Treatment Time: 61       Treatment Area: Chronic right shoulder pain [M25.511, G89.29]    SUBJECTIVE  Pain Level (0-10 scale): 7/10  Any medication changes, allergies to medications, adverse drug reactions, diagnosis change, or new procedure performed?: [x] No    [] Yes (see summary sheet for update)  Subjective functional status/changes:   [] No changes reported   I haven't had the spinning, but I am a little dizzy in the morning.    Getting dressed is commical.   OBJECTIVE    Modality rationale: decrease inflammation and decrease pain to improve the patients ability to tolerate ADLs   Min Type Additional Details   15 [x] Estim:  [x]Unatt       [x]IFC  []Premod                        []Other:  [x]w/ice   []w/heat  Position: semi reclined  Location: R shoulder     [] Estim: []Att    []TENS instruct  []NMES                    []Other:  []w/US   []w/ice   []w/heat  Position:  Location:    []  Traction: [] Cervical       []Lumbar                       [] Prone          []Supine                       []Intermittent   []Continuous Lbs:  [] before manual  [] after manual    []  Ultrasound: []Continuous   [] Pulsed                           []1MHz   []3MHz W/cm2:  Location:    []  Iontophoresis with dexamethasone         Location: [] Take home patch   [] In clinic   6 -pre session []  Ice     [x]  heat  []  Ice massage  []  Laser   []  Anodyne Position: semi reclined  Location: R shoulder    []  Laser with stim  []  Other:  Position:  Location:    []  Vasopneumatic Device Pressure:       [] lo [] med [] hi   Temperature: [] lo [] med [] hi   [x] Skin assessment post-treatment:  [x]intact []redness- no adverse reaction    []redness  adverse reaction:     30 min Therapeutic Exercise:  [x] See flow sheet :   Rationale: increase ROM and increase strength to improve the patients ability to reach and lift overhead    23 min Therapeutic Activity:  [x]  See flow sheet :   Rationale: increase strength and improve coordination  to improve the patients ability to perform dressing and home care     10 min Manual Therapy:  Gr II-III inferior glides, gentle PROM in all planes, stm to upper trap, deltoid, infraspinatus  Sidelying scap mobs, shoulder extension PROM. The manual therapy interventions were performed at a separate and distinct time from the therapeutic activities interventions. Rationale: increase ROM and increase tissue extensibility to improve tissue excursion during functional activities           With   [x] TE   [] TA   [] neuro   [] other: Patient Education: [x] Review HEP    [] Progressed/Changed HEP based on:   [] positioning   [] body mechanics   [] transfers   [] heat/ice application    [] other:            Other Objective/Functional Measures:   25 % of overalll improvements. \"I can go up a little higher than I used to. \"  At night, my pain is still the same, not much change. \"   AAROM on finger ladder 112deg of flexion  AAROM ER:10deg     Pain Level (0-10 scale) post treatment: 4/10 at rest      ASSESSMENT/Changes in Function:   Patient reports difficulty with writing on black board and dressing in the morning. Pt states she is not consistent with HEP. PT instructed pt to perform stretches 2x/day and isometrics 1x/day. .    Patient will continue to benefit from skilled PT services to modify and progress therapeutic interventions, address functional mobility deficits, address ROM deficits, address strength deficits, analyze and address soft tissue restrictions, analyze and cue movement patterns, analyze and modify body mechanics/ergonomics, assess and modify postural abnormalities, address imbalance/dizziness and instruct in home and community integration to attain remaining goals. [x]  See Plan of Care  []  See progress note/recertification  []  See Discharge Summary         Progress towards goals / Updated goals:     Short Term Goals: To be accomplished in 1 weeks:  1) Goal: Patient will be independent and compliant with HEP in order to progress toward long term goals. Status at last note/certification: issued and reviewed Progressing, pt has performed 1x at home 2/24/21  Long Term Goals: To be accomplished in 8-12 treatments:  1) Goal: Patient will improve FOTO assessment score to 63 pts in order to indicate improved functional abilities. Status at last note/certification: 44 pts  2) Goal: Patient will improve R shoulder flexion to 150 deg or better for overhead reaching  Status at last note/certification: 45 deg  Current: progressing - R shoulder flexion 100 degrees (3/9/2021)  3) Goal: Patient will report worst shoulder pain as 4/10 or less in order to progress toward personal goals. Status at last note/certification: 93/75  4) Goal: Patient will report overall at least 75% improvement in function in order to progress toward premorbid status.   Status at last note/certification: n/a  6) Goal: Patient will report ability to sleep through the night waking 1x or less for sufficient rest  Status at last note/certification: wakes 3-4 times per night  Current: Continues to have frequent pain at night (3/9/2021)       PLAN  []  Upgrade activities as tolerated     []  Continue plan of care  []  Update interventions per flow sheet       []  Discharge due to:_  []  Other:_       Bernard Jaramillo, PT 3/11/2021  9:51 AM    Future Appointments   Date Time Provider Eveline Merida   3/11/2021  4:15 PM [facility]   3/16/2021  4:15 PM [facility]   3/18/2021  4:15 PM [facility] PT [facility]

## 2021-03-16 ENCOUNTER — HOSPITAL ENCOUNTER (OUTPATIENT)
Dept: PHYSICAL THERAPY | Age: 61
Discharge: HOME | End: 2021-03-16
Payer: COMMERCIAL

## 2021-03-16 PROCEDURE — 97530 THERAPEUTIC ACTIVITIES: CPT | Performed by: GENERAL ACUTE CARE HOSPITAL

## 2021-03-16 PROCEDURE — 97110 THERAPEUTIC EXERCISES: CPT | Performed by: GENERAL ACUTE CARE HOSPITAL

## 2021-03-16 PROCEDURE — 97140 MANUAL THERAPY 1/> REGIONS: CPT | Performed by: GENERAL ACUTE CARE HOSPITAL

## 2021-03-16 PROCEDURE — 97014 ELECTRIC STIMULATION THERAPY: CPT | Performed by: GENERAL ACUTE CARE HOSPITAL

## 2021-03-16 NOTE — PROGRESS NOTES
PT DAILY TREATMENT NOTE     Patient Name: Shelbi Mackenzie [facility]  Date:3/16/2021  : 1960  [x]  Patient  Verified  Payor: [place] / [place]: [place] [employer] / Product Type: PPO /    In time:  4:19  Out time:   5:28  Total Treatment Time (min): 69  Visit #: 7 of     Medicare/BCBS Only   Total Timed Codes (min):  53 1:1 Treatment Time: 53       Treatment Area: Chronic right shoulder pain [M25.511, G89.29]    SUBJECTIVE  Pain Level (0-10 scale): 210   Any medication changes, allergies to medications, adverse drug reactions, diagnosis change, or new procedure performed?: [x] No    [] Yes (see summary sheet for update)  Subjective functional status/changes:   [] No changes reported   Pt reports feeling pretty good today. States that she reached across to turn her alarm off this morning which caused irritation, but took Tylenol and resolved. Plans to have second round of vaccine tomorrow- will call if unable to attend Thursday.      OBJECTIVE    Modality rationale: decrease inflammation and decrease pain to improve the patients ability to tolerate ADLs   Min Type Additional Details   10 [x] Estim:  [x]Unatt       [x]IFC  []Premod                        []Other:  [x]w/ice   []w/heat  Position: semi reclined  Location: R shoulder     [] Estim: []Att    []TENS instruct  []NMES                    []Other:  []w/US   []w/ice   []w/heat  Position:  Location:    []  Traction: [] Cervical       []Lumbar                       [] Prone          []Supine                       []Intermittent   []Continuous Lbs:  [] before manual  [] after manual    []  Ultrasound: []Continuous   [] Pulsed                           []1MHz   []3MHz W/cm2:  Location:    []  Iontophoresis with dexamethasone         Location: [] Take home patch   [] In clinic   6 -pre session []  Ice     [x]  heat  []  Ice massage  []  Laser   []  Anodyne Position: semi reclined  Location: R shoulder    []  Laser with stim  []  Other:  Position:  Location:    [] Vasopneumatic Device Pressure:       [] lo [] med [] hi   Temperature: [] lo [] med [] hi   [x] Skin assessment post-treatment:  [x]intact []redness- no adverse reaction    []redness  adverse reaction:     23 min Therapeutic Exercise:  [x] See flow sheet :   Rationale: increase ROM and increase strength to improve the patients ability to reach and lift overhead    20 min Therapeutic Activity:  [x]  See flow sheet :   Rationale: increase strength and improve coordination  to improve the patients ability to perform dressing and home care     10 min Manual Therapy:  Gr II-III inferior glides, gentle PROM in all planes, stm to upper trap, deltoid, infraspinatus  Sidelying scap mobs, shoulder extension PROM    The manual therapy interventions were performed at a separate and distinct time from the therapeutic activities interventions. Rationale: increase ROM and increase tissue extensibility to improve tissue excursion during functional activities           With   [x] TE   [] TA   [] neuro   [] other: Patient Education: [x] Review HEP    [] Progressed/Changed HEP based on:   [] positioning   [] body mechanics   [] transfers   [] heat/ice application    [] other:            Other Objective/Functional Measures:   AAROM on finger ladder 130 deg  Added FIR stretch     Pain Level (0-10 scale) post treatment: 0/10 at rest       ASSESSMENT/Changes in Function: Much improved AAROM flexion to 130 degrees today with only minimal pain at end range. Noted improved tolerance to PROM but remains with mild guarding. Making good progress at this time. Plan to complete PN next visit and update goals.        Patient will continue to benefit from skilled PT services to modify and progress therapeutic interventions, address functional mobility deficits, address ROM deficits, address strength deficits, analyze and address soft tissue restrictions, analyze and cue movement patterns, analyze and modify body mechanics/ergonomics, assess and modify postural abnormalities, address imbalance/dizziness and instruct in home and community integration to attain remaining goals. [x]  See Plan of Care  []  See progress note/recertification  []  See Discharge Summary         Progress towards goals / Updated goals:     Short Term Goals: To be accomplished in 1 weeks:  1) Goal: Patient will be independent and compliant with HEP in order to progress toward long term goals. Status at last note/certification: issued and reviewed Progressing, pt has performed 1x at home 2/24/21  Long Term Goals: To be accomplished in 8-12 treatments:  1) Goal: Patient will improve FOTO assessment score to 63 pts in order to indicate improved functional abilities. Status at last note/certification: 44 pts  2) Goal: Patient will improve R shoulder flexion to 150 deg or better for overhead reaching  Status at last note/certification: 45 deg  Current: progressing - R shoulder flexion 100 degrees (3/9/2021)  3) Goal: Patient will report worst shoulder pain as 4/10 or less in order to progress toward personal goals. Status at last note/certification: 12/94  4) Goal: Patient will report overall at least 75% improvement in function in order to progress toward premorbid status. Status at last note/certification: n/a  6) Goal: Patient will report ability to sleep through the night waking 1x or less for sufficient rest  Status at last note/certification: wakes 3-4 times per night  Current: Continues to have frequent pain at night (3/9/2021)       PLAN  []  Upgrade activities as tolerated     []  Continue plan of care  []  Update interventions per flow sheet       []  Discharge due to:_  []  Other:_   PN due NV.      Marisa Muro, PT 3/16/2021  9:51 AM    Future Appointments   Date Time Provider Eveline Merida   3/16/2021  4:15 PM [facility] PT [facility]   3/18/2021  4:15 PM [facility] PT [facility]

## 2021-03-18 ENCOUNTER — HOSPITAL ENCOUNTER (OUTPATIENT)
Dept: PHYSICAL THERAPY | Age: 61
End: 2021-03-18
Payer: COMMERCIAL

## 2021-03-25 ENCOUNTER — HOSPITAL ENCOUNTER (OUTPATIENT)
Dept: PHYSICAL THERAPY | Age: 61
Discharge: HOME | End: 2021-03-25
Payer: COMMERCIAL

## 2021-03-25 PROCEDURE — 97140 MANUAL THERAPY 1/> REGIONS: CPT | Performed by: GENERAL ACUTE CARE HOSPITAL

## 2021-03-25 PROCEDURE — 97110 THERAPEUTIC EXERCISES: CPT | Performed by: GENERAL ACUTE CARE HOSPITAL

## 2021-03-25 PROCEDURE — 97530 THERAPEUTIC ACTIVITIES: CPT | Performed by: GENERAL ACUTE CARE HOSPITAL

## 2021-03-25 NOTE — PROGRESS NOTES
PT DAILY TREATMENT NOTE     Patient Name: Lonnie Zayas [facility]  Date:3/25/2021  : 1960  [x]  Patient  Verified  Payor: [place] / [place]: [place] [employer] / Product Type: PPO /    In time:  335  Out time:   430  Total Treatment Time (min): 55  Visit #: 8 of     Medicare/BCBS Only   Total Timed Codes (min):  39 1:1 Treatment Time: 39       Treatment Area: Chronic right shoulder pain [M25.511, G89.29]    SUBJECTIVE  Pain Level (0-10 scale): 3/10   Any medication changes, allergies to medications, adverse drug reactions, diagnosis change, or new procedure performed?: [x] No    [] Yes (see summary sheet for update)  Subjective functional status/changes:   [] No changes reported   Pt reports having a single moment of intense pain the other day. Having an easier time reaching forward/OH, but reaching laterally and across body remains difficult. Has noticed weakness of R arm, having trouble carrying and lifting common house hold items (coffee mug).      OBJECTIVE    Modality rationale: decrease inflammation and decrease pain to improve the patients ability to tolerate ADLs   Min Type Additional Details    [x] Estim:  [x]Unatt       [x]IFC  []Premod                        []Other:  [x]w/ice   []w/heat  Position: semi reclined  Location: R shoulder     [] Estim: []Att    []TENS instruct  []NMES                    []Other:  []w/US   []w/ice   []w/heat  Position:  Location:    []  Traction: [] Cervical       []Lumbar                       [] Prone          []Supine                       []Intermittent   []Continuous Lbs:  [] before manual  [] after manual    []  Ultrasound: []Continuous   [] Pulsed                           []1MHz   []3MHz W/cm2:  Location:    []  Iontophoresis with dexamethasone         Location: [] Take home patch   [] In clinic   6  MHP-pre session  10 - CP post session []  Ice     [x]  heat  []  Ice massage  []  Laser   []  Anodyne Position: semi reclined  Location: R shoulder    []  Laser with stim  []  Other:  Position:  Location:    []  Vasopneumatic Device Pressure:       [] lo [] med [] hi   Temperature: [] lo [] med [] hi   [x] Skin assessment post-treatment:  [x]intact []redness- no adverse reaction    []redness  adverse reaction:     18 min Therapeutic Exercise:  [x] See flow sheet : including re-assessment and FOTO    Rationale: increase ROM and increase strength to improve the patients ability to reach and lift overhead    11 min Therapeutic Activity:  [x]  See flow sheet :   Rationale: increase strength and improve coordination  to improve the patients ability to perform dressing and home care     10 min Manual Therapy:  Gr II-III post/inferior glides,  PROM in all planes, stm to upper trap, deltoid, infraspinatus   The manual therapy interventions were performed at a separate and distinct time from the therapeutic activities interventions. Rationale: increase ROM and increase tissue extensibility to improve tissue excursion during functional activities           With   [x] TE   [] TA   [] neuro   [] other: Patient Education: [x] Review HEP    [] Progressed/Changed HEP based on:   [] positioning   [] body mechanics   [] transfers   [] heat/ice application    [] other:     Updated HEP to included ER/IR stretching at home        Other Objective/Functional Measures: FOTO: 55/100  Subjective % improvement: 65-70%  Improvements: reduced pain levels - (no longer taking Tramadol), improved sleep, increased ROM   Deficits: reaching overhead, reaching across body, reaching behind back, lifting, lack of strength  Pain: (A) 4/10  (W) 8/10   Shoulder AROM (PROM): flex 122 (145)  Abd 78 (90 p!)   PERI to ear  (15-20 p!)  FIR lateral glut  (45)   Shoulder strength (through avaliable range): flex 3+/5, abd 3/5 , ER 3-/5, IR 3+/5    Pain Level (0-10 scale) post treatment: 0/10 at rest       ASSESSMENT/Changes in Function: See PN. Plan to continue progressing shoulder ROM.  Add strengthening exercises now that pain is in a more tolerable range. Patient will continue to benefit from skilled PT services to modify and progress therapeutic interventions, address functional mobility deficits, address ROM deficits, address strength deficits, analyze and address soft tissue restrictions, analyze and cue movement patterns, analyze and modify body mechanics/ergonomics, assess and modify postural abnormalities, address imbalance/dizziness and instruct in home and community integration to attain remaining goals. []  See Plan of Care  [x]  See progress note/recertification 9/49/5537  []  See Discharge Summary         Progress towards goals / Updated goals:     Short Term Goals: To be accomplished in 1 weeks:  1) Goal: Patient will be independent and compliant with HEP in order to progress toward long term goals. Status at last note/certification: issued and reviewed   Current: met - compliant with daily HEP (3/25/21)  Long Term Goals: To be accomplished in 8-12 treatments:  1) Goal: Patient will improve FOTO assessment score to 63 pts in order to indicate improved functional abilities. Status at last note/certification: 44 pts  Current: progressing 55/100 (3/25/2021)  2) Goal: Patient will improve R shoulder flexion to 150 deg or better for overhead reaching  Status at last note/certification: 45 deg  Current: progressing - R shoulder flexion 122 degrees (3/25/2021)  3) Goal: Patient will report worst shoulder pain as 4/10 or less in order to progress toward personal goals. Status at last note/certification: 31/19  Current: Progressing - 8/10 worst pain (2/25/21)  4) Goal: Patient will report overall at least 75% improvement in function in order to progress toward premorbid status.   Status at last note/certification: n/a  Current: nearly met- 65-70% improvement (3/25/2021)  6) Goal: Patient will report ability to sleep through the night waking 1x or less for sufficient rest  Status at last note/certification: wakes 3-4 times per night  Current: Continues to have frequent pain at night (3/9/2021)       PLAN  [x]  Upgrade activities as tolerated     []  Continue plan of care  []  Update interventions per flow sheet       []  Discharge due to:_  [x]  Other:_  See PN. Cont 2x for 8-10 treatments.      Josué Young, PT 3/25/2021  9:51 AM    Future Appointments   Date Time Provider Eveline Merida   3/25/2021  3:30 PM [facility] PT [place] 2 [facility]

## 2021-04-05 ENCOUNTER — HOSPITAL ENCOUNTER (OUTPATIENT)
Dept: PHYSICAL THERAPY | Age: 61
Discharge: HOME | End: 2021-04-05
Payer: COMMERCIAL

## 2021-04-05 PROCEDURE — 97110 THERAPEUTIC EXERCISES: CPT

## 2021-04-05 PROCEDURE — 97140 MANUAL THERAPY 1/> REGIONS: CPT

## 2021-04-05 PROCEDURE — 97530 THERAPEUTIC ACTIVITIES: CPT

## 2021-04-05 NOTE — PROGRESS NOTES
PT DAILY TREATMENT NOTE 8-14    Patient Name: Nataliya Lazo  Date:2021  : 1960  [x]  Patient  Verified  Payor: [place] / [place]: [place] [employer] / Product Type: PPO /    In time:9:20  Out time:10:26  Total Treatment Time (min): 66  Total Timed Codes (min): 50  1:1 Treatment Time (min): 50   Visit #: 1 of 8    Treatment Area: Chronic right shoulder pain [M25.511, G89.29]    SUBJECTIVE  Pain Level (0-10 scale): 2-3  Any medication changes, allergies to medications, adverse drug reactions, diagnosis change, or new procedure performed?: [x] No    [] Yes (see summary sheet for update)  Subjective functional status/changes:   [] No changes reported  I'm on spring break, so i'm relaxed. The shoulder is reaching better up and OH but still limited in reaching out and behind the back.      OBJECTIVE  Modality rationale: decrease edema, decrease inflammation, decrease pain and increase tissue extensibility to improve the patients ability to reach OH, sustsained OH movements for work    Min Type Additional Details   6 MHP pre session  10' - CP post session  [x]  Ice     [x]  heat  []  Ice massage Position: semireclined  Location: R shoulder   [x] Skin assessment post-treatment:  [x]intact []redness- no adverse reaction       []redness  adverse reaction:       30 min Therapeutic Exercise:  [x] See flow sheet : increased rows and Shoulder ER    Rationale: increase ROM and increase strength to improve the patients ability to reach OH, ADLs, mobility     10 min Manual Therapy:  Gr II-III post/inferior glides,  PROM in all planes, stm to upper trap, deltoid, infraspinatus   Rationale: decrease pain, increase ROM and increase tissue extensibility to improve OH and behind the back ROM for ADLs and work   The manual therapy interventions were performed at a separate and distinct time from the therapeutic activities interventions   (na: Add 59 Modifier in conjunction with TA treatment)    10 min Therapeutic Activity:  [] See flow sheet :   Rationale: To restore reaching, OH, ADLs, dressing          x min Patient Education: [x] Review HEP    [] Progressed/Changed HEP based on:   [] positioning   [] body mechanics   [] transfers   [] heat/ice application        Other Objective/Functional Measures:   AAROM finger ladder: R shoulder flexion 121 deg; scaption 120 deg;       Pain Level (0-10 scale) post treatment: 0    ASSESSMENT/Changes in Function: Pt slowly progressing with AAROM and PROM elevation. Pain levels decreased today and increased tolerance to treatment. Demo's good response to increased strengthening     Patient will continue to benefit from skilled PT services to modify and progress therapeutic interventions, address functional mobility deficits, address ROM deficits, address strength deficits, analyze and address soft tissue restrictions, analyze and cue movement patterns, analyze and modify body mechanics/ergonomics, assess and modify postural abnormalities, address imbalance/dizziness and instruct in home and community integration to attain remaining goals. []  See Plan of Care  [x]  See progress note/recertification  []  See Discharge Summary         Progress towards goals / Updated goals:    Long Term Goals: To be accomplished in 8-12 treatments:  1) Goal: Patient will be independent and compliant with HEP in order to progress toward long term goals. Status at last note/certification: compliant with daily HEP (3/25/21)  Current: con't compliance (4/5/21)  2) Goal: Patient will improve FOTO assessment score to 63 pts in order to indicate improved functional abilities.   Status at last note/certification: progressing 55/100 (3/25/2021)  Current:   3) Goal: Patient will improve R shoulder flexion to 150 deg or better for overhead reaching  Status at last note/certification:  R shoulder flexion 122 degrees (3/25/2021)   Current: slowly progressing with flexion 121, scaption 120 (4/5/21)  4) Goal: Patient will report worst shoulder pain as 4/10 or less in order to progress toward personal goals. Status at last note/certification: 0/62 worst pain (2/25/21)  5) Goal: Patient will report overall at least 75% improvement in function in order to progress toward premorbid status.   Status at last note/certification: nearly met- 65-70% improvement (3/25/2021)  Current:   6) Goal: Patient will report ability to sleep through the night waking 1x or less for sufficient rest  Status at last note/certification:  Continues to have frequent pain at night (3/9/2021)    PLAN  [x]  Upgrade activities as tolerated     [x]  Continue plan of care  []  Update interventions per flow sheet       []  Discharge due to:_  [x]  Other:_  Pt to schedule further appts DAVID Bah, PT 4/5/2021  8:04 AM    Future Appointments   Date Time Provider Eveline Merida   4/5/2021  9:15 AM Danis Ross, PT [facility]   4/8/2021  1:15 PM Danny Zepeda, PTA [facility]

## 2021-04-08 ENCOUNTER — HOSPITAL ENCOUNTER (OUTPATIENT)
Dept: PHYSICAL THERAPY | Age: 61
Discharge: HOME | End: 2021-04-08
Payer: COMMERCIAL

## 2021-04-08 PROCEDURE — 97140 MANUAL THERAPY 1/> REGIONS: CPT

## 2021-04-08 PROCEDURE — 97110 THERAPEUTIC EXERCISES: CPT

## 2021-04-08 PROCEDURE — 97530 THERAPEUTIC ACTIVITIES: CPT

## 2021-04-08 NOTE — PROGRESS NOTES
PT DAILY TREATMENT NOTE 8-    Patient Name: Daniel Mcclendon  Date:2021  : 1960  [x]  Patient  Verified  Payor: Lucious [place] / Plan: [place] [employer] [place] / Product Type: PPO /    In time: 1:20 pm                Out time: 2:23 pm  Total Treatment Time (min): 63  Visit #: 2 of 8    Treatment Area: Chronic right shoulder pain [M25.511, G89.29]    SUBJECTIVE  Pain Level (0-10 scale): 0 at rest  Any medication changes, allergies to medications, adverse drug reactions, diagnosis change, or new procedure performed?: [x] No    [] Yes (see summary sheet for update)  Subjective functional status/changes:   [] No changes reported  Patient states 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 She states she wakes roughly 2-3 times nightly due to shoulder pain but is able to reposition and return to sleep.     OBJECTIVE  Modality rationale: decrease edema, decrease inflammation, decrease pain and increase tissue extensibility to improve the patients ability to reach OH, sustsained OH movements for work    Min Type Additional Details   6 MHP pre session  10' - CP post session  [x]  Ice     [x]  Heat  []  Ice massage Position: semireclined  Location: R shoulder   [x] Skin assessment post-treatment:  [x]intact []redness- no adverse reaction       []redness  adverse reaction:       25 min Therapeutic Exercise:  [x] See flow sheet:    Rationale: increase ROM and increase strength to improve the patients ability to reach [facility], ADLs, mobility     12 min Manual Therapy:  grade II-III post/inferior glides, PROM in all planes, stm to upper trap, deltoid, infraspinatus; prone massage gun STM to (B) periscapulars f/b prone PA mobs to the T/S grade III/IV   Rationale: decrease pain, increase ROM and increase tissue extensibility to improve OH and behind the back ROM for ADLs and work   The manual therapy interventions were performed at a separate and distinct time from the therapeutic activities interventions   (na: Add 61 Modifier in conjunction with TA treatment)    10 min Therapeutic Activity:  [x]  See flow sheet:   Rationale:  to restore reaching, OH, ADLs, dressing          X min Patient Education: [x] Review HEP     Other Objective/Functional Measures:   (R) shoulder AAROM: flex 140 deg, 123 deg   FIR AAROM to level of (R) lateral glut      Pain Level (0-10 scale) post treatment: 0    ASSESSMENT/Changes in Function:   Patient demonstrates increasing GHJ mobility into flexion and scaption. Most hypomobility noted in the transverse plane of the GHJ as expected with patient's dx. Improved comfort during capsular stretching with posteroinferior GHJ glides. Patient will continue to benefit from skilled PT services to modify and progress therapeutic interventions, address functional mobility deficits, address ROM deficits, address strength deficits, analyze and address soft tissue restrictions, analyze and cue movement patterns, analyze and modify body mechanics/ergonomics, assess and modify postural abnormalities, address imbalance/dizziness and instruct in home and community integration to attain remaining goals. []  See Plan of Care  [x]  See progress note/recertification  []  See Discharge Summary         Progress towards goals / Updated goals:  Long Term Goals: To be accomplished in 8-12 treatments:  1) Goal: Patient will be independent and compliant with HEP in order to progress toward long term goals. Status at last note/certification: compliant with daily HEP (3/25/21)  Current: con't compliance (4/5/21)  2) Goal: Patient will improve FOTO assessment score to 63 pts in order to indicate improved functional abilities.   Status at last note/certification: progressing 55/100 (3/25/2021)  Current:   3) Goal: Patient will improve R shoulder flexion to 150 deg or better for overhead reaching  Status at last note/certification:  R shoulder flexion 122 degrees (3/25/2021)   Current: slowly progressing with AAROM flexion 140 deg, AAROM scaption 123 deg (4/8/21)  4) Goal: Patient will report worst shoulder pain as 4/10 or less in order to progress toward personal goals. Status at last note/certification: 7/77 worst pain (2/25/21)  5) Goal: Patient will report overall at least 75% improvement in function in order to progress toward premorbid status. Status at last note/certification: nearly met- 65-70% improvement (3/25/2021)  Current:   6) Goal: Patient will report ability to sleep through the night waking 1x or less for sufficient rest  Status at last note/certification:  Continues to have frequent pain at night (3/9/2021)    PLAN  [x]  Upgrade activities as tolerated     [x]  Continue plan of care  []  Update interventions per flow sheet       []  Discharge due to:_  []  Other:_      Jacqulin Schilder, [facility] 4/8/2021    No future appointments.

## 2021-04-14 ENCOUNTER — HOSPITAL ENCOUNTER (OUTPATIENT)
Dept: PHYSICAL THERAPY | Age: 61
Discharge: HOME | End: 2021-04-14
Payer: COMMERCIAL

## 2021-04-14 PROCEDURE — 97530 THERAPEUTIC ACTIVITIES: CPT

## 2021-04-14 PROCEDURE — 97110 THERAPEUTIC EXERCISES: CPT

## 2021-04-14 PROCEDURE — 97140 MANUAL THERAPY 1/> REGIONS: CPT

## 2021-04-14 NOTE — PROGRESS NOTES
PT DAILY TREATMENT NOTE     Patient Name: Rachel Kennedy  FASV:  : 1960  [x]  Patient  Verified  Payor: [place] / Plan: [place] [employer] / Product Type: PPO /    In time: 500 pm                Out time: 605 pm  Total Treatment Time (min): 65  Visit #: 3 of 8    Treatment Area: Chronic right shoulder pain [M25.511, G89.29]    SUBJECTIVE  Pain Level (0-10 scale): 0 at rest  Any medication changes, allergies to medications, adverse drug reactions, diagnosis change, or new procedure performed?: [x] No    [] Yes (see summary sheet for update)  Subjective functional status/changes:   [] No changes reported  Patient reports her shoulder has been slowly getting better    OBJECTIVE  Modality rationale: decrease edema, decrease inflammation, decrease pain and increase tissue extensibility to improve the patients ability to reach OH, sustsained OH movements for work    [place] Type Additional Details   6 MHP pre session  10' - CP post session  [x]  Ice     [x]  Heat  []  Ice massage Position: semireclined  Location: R shoulder   [x] Skin assessment post-treatment:  [x]intact []redness- no adverse reaction       []redness  adverse reaction:       27 min Therapeutic Exercise:  [x] See flow sheet:    Rationale: increase ROM and increase strength to improve the patients ability to reach [facility], ADLs, mobility     12 min Manual Therapy:  grade II-III post/inferior glides, PROM in all planes, stm to upper trap, deltoid, infraspinatus, manual elbow extension/supination stretch   Rationale: decrease pain, increase ROM and increase tissue extensibility to improve OH and behind the back ROM for ADLs and work   The manual therapy interventions were performed at a separate and distinct time from the therapeutic activities interventions   (na: Add 59 Modifier in conjunction with TA treatment)    10 min Therapeutic Activity:  [x]  See flow sheet:   Rationale:  to restore reaching, OH, ADLs, dressing          X min Patient Education: [x] Review HEP     Other Objective/Functional Measures: Added sleeper stretch      Pain Level (0-10 scale) post treatment: 0    ASSESSMENT/Changes in Function:   Pt was able to tolerate addition of isometric ER/IR today with intention of improving rotator cuff strength for return to overhead lifting. She was also able to tolerate progression of wall ladder to wall wipes without complaint of inc sx. She was also able to tolerate progression of YTB ER iso to part ROM ER and IR. Added sleeper stretch to assess if more tolerable than FIR with strap. Pt was instructed to utilize heat/ice at home PRN to address pain. Patient will continue to benefit from skilled PT services to modify and progress therapeutic interventions, address functional mobility deficits, address ROM deficits, address strength deficits, analyze and address soft tissue restrictions, analyze and cue movement patterns, analyze and modify body mechanics/ergonomics, assess and modify postural abnormalities, address imbalance/dizziness and instruct in home and community integration to attain remaining goals. []  See Plan of Care  [x]  See progress note/recertification  []  See Discharge Summary         Progress towards goals / Updated goals:  Long Term Goals: To be accomplished in 8-12 treatments:  1) Goal: Patient will be independent and compliant with HEP in order to progress toward long term goals. Status at last note/certification: compliant with daily HEP (3/25/21)  Current: con't compliance (4/5/21)  2) Goal: Patient will improve FOTO assessment score to 63 pts in order to indicate improved functional abilities.   Status at last note/certification: progressing 55/100 (3/25/2021)  Current:   3) Goal: Patient will improve R shoulder flexion to 150 deg or better for overhead reaching  Status at last note/certification:  R shoulder flexion 122 degrees (3/25/2021)   Current: slowly progressing with AAROM flexion 140 deg, AAROM scaption 123 deg (4/8/21)  4) Goal: Patient will report worst shoulder pain as 4/10 or less in order to progress toward personal goals. Status at last note/certification: 8/91 worst pain (2/25/21)  5) Goal: Patient will report overall at least 75% improvement in function in order to progress toward premorbid status.   Status at last note/certification: nearly met- 65-70% improvement (3/25/2021)  Current:   6) Goal: Patient will report ability to sleep through the night waking 1x or less for sufficient rest  Status at last note/certification:  Continues to have frequent pain at night (4/14/2021)    PLAN  [x]  Upgrade activities as tolerated     [x]  Continue plan of care  []  Update interventions per flow sheet       []  Discharge due to:_  []  Other:_      Maryann Jeronimoernestine 4/14/2021    Future Appointments   Date Time Provider Eveline Merida   4/14/2021  5:00 PM Carolynn Waters [facility]   4/20/2021  5:00 PM [facility] PT [place] [facility]   4/22/2021  5:00 PM Brown Posada, PT [facility]   4/27/2021  5:00 PM [facility] PT [place] [facility]   4/29/2021  5:00 PM Brown Posada, PT [facility]   5/4/2021  4:15 PM Mami Mejía, PT [facility]

## 2021-04-20 ENCOUNTER — HOSPITAL ENCOUNTER (OUTPATIENT)
Dept: PHYSICAL THERAPY | Age: 61
Discharge: HOME | End: 2021-04-20
Payer: COMMERCIAL

## 2021-04-20 PROCEDURE — 97110 THERAPEUTIC EXERCISES: CPT | Performed by: GENERAL ACUTE CARE HOSPITAL

## 2021-04-20 PROCEDURE — 97140 MANUAL THERAPY 1/> REGIONS: CPT | Performed by: GENERAL ACUTE CARE HOSPITAL

## 2021-04-20 PROCEDURE — 97530 THERAPEUTIC ACTIVITIES: CPT | Performed by: GENERAL ACUTE CARE HOSPITAL

## 2021-04-20 NOTE — PROGRESS NOTES
PT DAILY TREATMENT NOTE     Patient Name: Ami Britton [facility]  Date:2021  : 1960  [x]  Patient  Verified  Payor: Emile Sorensen / Plan: [place] [employer] / Product Type: PPO /    In time: 5:00         Out time:  6:00  Total Treatment Time (min): 60  Visit #: 4 of 8    Treatment Area: Chronic right shoulder pain [M25.511, G89.29]    SUBJECTIVE  Pain Level (0-10 scale): 5/10  Any medication changes, allergies to medications, adverse drug reactions, diagnosis change, or new procedure performed?: [x] No    [] Yes (see summary sheet for update)  Subjective functional status/changes:   [] No changes reported  Pt reports that her R shoulder has been getting better. However, this weekend she was given a recliner chair and had to lift the chair (2 parts) out of the car and onto a meagan. Then was able to use the elevator to transport the chair to her apt. Then put it together. Reports increased pain in R shoulder since this. Had to take a pain pill for relief, which she hasn't needed for several weeks.      OBJECTIVE  Modality rationale: decrease edema, decrease inflammation, decrease pain and increase tissue extensibility to improve the patients ability to reach OH, sustsained OH movements for work    Min Type Additional Details   6 MHP pre session  10' - CP post session  [x]  Ice     [x]  Heat  []  Ice massage Position: semireclined  Location: R shoulder   [x] Skin assessment post-treatment:  [x]intact []redness- no adverse reaction       []redness  adverse reaction:       24 min Therapeutic Exercise:  [x] See flow sheet:    Rationale: increase ROM and increase strength to improve the patients ability to reach [facility], ADLs, mobility     10 min Manual Therapy:  grade II-III post/inferior glides, PROM in all planes, stm to upper trap, deltoid, infraspinatus    Rationale: decrease pain, increase ROM and increase tissue extensibility to improve OH and behind the back ROM for ADLs and work   The manual therapy interventions were performed at a separate and distinct time from the therapeutic activities interventions   (na: Add 61 Modifier in conjunction with TA treatment)    10 min Therapeutic Activity:  [x]  See flow sheet:   Rationale:  to restore reaching, OH, ADLs, dressing          X min Patient Education: [x] Review HEP     Other Objective/Functional Measures:    R shoulder AROM: flex 132, abd 88, FIR to upper glut   Worst pain: 4/10 (besides this past weekend)  Deficits: writing OH on chalkboard, lifting, carrying, reaching behind back - donning pants/bra     Pain Level (0-10 scale) post treatment: 0/10     ASSESSMENT/Changes in Function:   Pt is making gradual progress with improvements in R shoulder AROM and reduced pain, however had a set back this weekend after doing some manual labor to assemble furniture. TC for correct form with resisted ER/IR. Plan to complete re-assessment NV. Patient will continue to benefit from skilled PT services to modify and progress therapeutic interventions, address functional mobility deficits, address ROM deficits, address strength deficits, analyze and address soft tissue restrictions, analyze and cue movement patterns, analyze and modify body mechanics/ergonomics, assess and modify postural abnormalities, address imbalance/dizziness and instruct in home and community integration to attain remaining goals. []  See Plan of Care  [x]  See progress note/recertification  []  See Discharge Summary         Progress towards goals / Updated goals:  Long Term Goals: To be accomplished in 8-12 treatments:  1) Goal: Patient will be independent and compliant with HEP in order to progress toward long term goals. Status at last note/certification: compliant with daily HEP (3/25/21)  Current: con't compliance (4/5/21)  2) Goal: Patient will improve FOTO assessment score to 63 pts in order to indicate improved functional abilities.   Status at last note/certification: progressing 55/100 (3/25/2021)  Current: will assess at PN   3) Goal: Patient will improve R shoulder flexion to 150 deg or better for overhead reaching  Status at last note/certification:  R shoulder flexion 122 degrees (3/25/2021)   Current: slowly progressing with AAROM flexion 140 deg, AAROM scaption 123 deg (4/8/21); AROM flex 132 (4/20/21)  4) Goal: Patient will report worst shoulder pain as 4/10 or less in order to progress toward personal goals. Status at last note/certification: 4/27 worst pain (2/25/21)  Current: 4/10 worst pain  (4/20/21)  5) Goal: Patient will report overall at least 75% improvement in function in order to progress toward premorbid status.   Status at last note/certification: nearly met- 65-70% improvement (3/25/2021)  Current: assess at PN  6) Goal: Patient will report ability to sleep through the night waking 1x or less for sufficient rest  Status at last note/certification:  Continues to have frequent pain at night (4/14/2021)   Current: prior to this weekend, 1-2x or less per night (4/20/21)    PLAN  [x]  Upgrade activities as tolerated     [x]  Continue plan of care  []  Update interventions per flow sheet       []  Discharge due to:_  [x]  Other:_  PN due at next visit     Radha Brooks PT 4/20/2021    Future Appointments   Date Time Provider Eveline Merida   4/20/2021  5:00 PM [facility] PT [place] [facility]   4/22/2021  5:00 PM Adarsh Noguera, PT [facility]   4/27/2021  5:00 PM [facility] PT [place] [facility]   4/29/2021  5:00 PM Adarsh Noguera, PT [facility]   5/4/2021  4:15 PM Ashley Mejía., PT [facility]

## 2021-04-22 ENCOUNTER — HOSPITAL ENCOUNTER (OUTPATIENT)
Dept: PHYSICAL THERAPY | Age: 61
Discharge: HOME | End: 2021-04-22
Payer: COMMERCIAL

## 2021-04-22 PROCEDURE — 97110 THERAPEUTIC EXERCISES: CPT

## 2021-04-22 PROCEDURE — 97140 MANUAL THERAPY 1/> REGIONS: CPT

## 2021-04-22 NOTE — PROGRESS NOTES
PT DAILY TREATMENT NOTE     Patient Name: Rolando Francisco  Date:2021  : 1960  [x]  Patient  Verified  Payor: Narda Moorekey / Plan: [place] [employer] / Product Type: PPO /    In time:5:02  Out time:5:58  Total Treatment Time (min): 56  Total Timed Codes (min): 40  1:1 Treatment Time (min): 40  Visit #: 5 of 8    Treatment Area: Chronic right shoulder pain [M25.511, G89.29]    SUBJECTIVE  Pain Level (0-10 scale): 0 at rest   Any medication changes, allergies to medications, adverse drug reactions, diagnosis change, or new procedure performed?: [x] No    [] Yes (see summary sheet for update)  Subjective functional status/changes:   [] No changes reported  See below    OBJECTIVE  Modality rationale: decrease edema, decrease inflammation, decrease pain and increase tissue extensibility to improve the patients ability to improve reaching, self-care, ADLs   Min Type Additional Details   6 min pre, 10 min CP post [x]  Ice     [x]  Heat - pre []  Ice massage Position: semireclined  Location: R shoulder   [x] Skin assessment post-treatment:  [x]intact []redness- no adverse reaction       []redness - adverse reaction:       30 min Therapeutic Exercise:  [x] See flow sheet :reassessment for D/C, performed FOTO with pt    Rationale: increase ROM and increase strength to improve the patients ability to reach, dress, drive, ADLs     10 min Manual Therapy:   grade II-III post/inferior glides, PROM in all planes, stm to upper trap, deltoid, infraspinatus    Rationale: decrease pain, increase ROM, increase tissue extensibility and decrease trigger points to improve ease of reaching, ADls, dressing, self-care   The manual therapy interventions were performed at a separate and distinct time from the therapeutic activities interventions   (na  Add 59 Modifier in conjunction with TA treatment)    x min Patient Education: [x] Review HEP    [] Progressed/Changed HEP based on: final DC planning     [] positioning   [] body mechanics   [] transfers   [] heat/ice application        Other Objective/Functional Measures: FOTO: 82/100  Subjective % improvement:  80%  Improvements: reduced pain levels; improving sleep (still min limited), increased ROM; improved reaching for chalkboard, improved tolerance to lifting normal house and school objets   Deficits: sustained writing OH on chalkboard, carrying, reaching behind back - donning pants/bra    Pain: (A) 4/10,  0 to 8/10   Shoulder AROM (PROM): flex 130 (145), scaption 108 (125)  Abd 85 (90 p!); ER at 0: 30;  PERI to ear  (at 45 dep!);  FIR lateral glut  (at 45 abd: 65)   Shoulder strength (through avaliable range): flex 3+/5, abd 3+/5 , ER 3/5, IR 4/5    Pain Level (0-10 scale) post treatment: 0    ASSESSMENT/Changes in Function: see D/C    Patient will continue to benefit from skilled PT services to modify and progress therapeutic interventions, address functional mobility deficits, address ROM deficits, address strength deficits, analyze and address soft tissue restrictions, analyze and cue movement patterns, analyze and modify body mechanics/ergonomics, assess and modify postural abnormalities, address imbalance/dizziness and instruct in home and community integration to attain remaining goals. []  See Plan of Care  [x]  See progress note/recertification  []  See Discharge Summary         Progress towards goals / Updated goals:     Long Term Goals: To be accomplished in 8-12 treatments:  1) Goal: Patient will be independent and compliant with HEP in order to progress toward long term goals. Status at last note/certification: compliant with daily HEP (3/25/21)  Current: con't compliance (21)  2) Goal: Patient will improve FOTO assessment score to 63 pts in order to indicate improved functional abilities.   Status at last note/certification: progressing 55/100 (3/25/2021)  Current:  Goal met at 82/100  (21)   3) Goal: Patient will improve R shoulder flexion to 150 deg or better for overhead reaching  Status at last note/certification:  R shoulder flexion 122 degrees (3/25/2021)   Current: slow progress; AROM (PROM): flex 130 (145), scaption 108 (125)  Abd 85 (90 p!);   (4/22/21)  4) Goal: Patient will report worst shoulder pain as 4/10 or less in order to progress toward personal goals. Status at last note/certification: 2/76 worst pain (2/25/21)  Current: goal progressing with 4/10 ave; 8/10 after moving a recliner  (4/22/21)  5) Goal: Patient will report overall at least 75% improvement in function in order to progress toward premorbid status. Status at last note/certification: nearly met- 65-70% improvement (3/25/2021)  Current:  Goal met at 80% (4/22/21)  6) Goal: Patient will report ability to sleep through the night waking 1x or less for sufficient rest  Status at last note/certification:  Continues to have frequent pain at night (4/14/2021)   Current: goal met; some nights waking 0x, some 1x briefly (4/22/21)       PLAN  []  Upgrade activities as tolerated     []  Continue plan of care  []  Update interventions per flow sheet       [x]  Discharge due to:_ gains and also financial constraints.    []  Other:_      Deon Peck, PT 4/22/2021  9:51 AM    Future Appointments   Date Time Provider Eveline Merida   4/22/2021  5:00 PM Gem Foley, PT [facility]   4/27/2021  5:00 PM [facility] PT [place] [facility]   4/29/2021  5:00 PM Gem Foley, PT [facility]   5/4/2021  4:15 PM Jeramie Booth, PT [facility]

## 2021-04-22 NOTE — PROGRESS NOTES
[address] [facility] AT [address], [place], [place]  Phone: [phone] Fax [phone] SUMMARY  Patient Name: Jagjit Jay : 1960   Treatment/Medical Diagnosis: Chronic right shoulder pain [M25.511, G89.29]   Referral Source: Adelia Bourgeois MD     Date of Initial Visit: 2/15/21 Attended Visits: 13 Missed Visits: 2     SUMMARY OF TREATMENT  Pt is Breanne Og presents with R shoulder pain. Signs and sx are consistent with adhesive capsulitis.  Current deficits include: significant ROM restrictions, dec functional strength, dec dynamic shoulder stability. Treatment Plan may include any combination of the following: Therapeutic exercise, Therapeutic activities, Neuromuscular re-education, Physical agent/modality, Gait/balance training, Manual therapy, Patient education, Self Care training, Functional mobility training, Home safety training and Stair training    CURRENT STATUS  Pt made slow progress in therapy. She does have improvements in pain and function overall but has not returned to her PLOF, mostly limited in elevation movements. She notes improved sleep tolerance, improved use of the R arm for dressing, driving. Limited at this point also by financial constraints and unable to con't with further therapy. Objective Measures:   FOTO: 82/100  Subjective % improvement:  80%  Improvements: reduced pain levels; improving sleep (still min limited), increased ROM; improved reaching for chalkboard, improved tolerance to lifting normal house and school objets   Deficits: sustained writing [place] on chalkboard, carrying, reaching behind back - donning pants/bra    Pain: (A) 4/10,  0 to 8/10   Shoulder AROM (PROM): flex 130 (145), scaption 108 (125)  Abd 85 (90 p!); ER at 0: 30;  PERI to ear  (at 45 dep!);  FIR lateral glut  (at 45 abd: 65)   Shoulder strength (through avaliable range): flex 3+/5, abd 3+/5 , ER 3/5, IR 4/5      Goals for this period include:  Long Term Goals: To be accomplished in 8-12 treatments:  1) Goal: Patient will be independent and compliant with HEP in order to progress toward long term goals. Status at last note/certification: compliant with daily HEP (3/25/21)  Current: con't compliance (4/22/21)  2) Goal: Patient will improve FOTO assessment score to 63 pts in order to indicate improved functional abilities. Status at last note/certification: progressing 55/100 (3/25/2021)  Current:  Goal met at 82/100  (4/22/21)   3) Goal: Patient will improve R shoulder flexion to 150 deg or better for overhead reaching  Status at last note/certification:  R shoulder flexion 122 degrees (3/25/2021)   Current: slow progress; AROM (PROM): flex 130 (145), scaption 108 (125)  Abd 85 (90 p!);   (4/22/21)  4) Goal: Patient will report worst shoulder pain as 4/10 or less in order to progress toward personal goals. Status at last note/certification: 8/33 worst pain (2/25/21)  Current: goal progressing with 4/10 ave; 8/10 after moving a recliner  (4/22/21)  5) Goal: Patient will report overall at least 75% improvement in function in order to progress toward premorbid status. Status at last note/certification: nearly met- 65-70% improvement (3/25/2021)  Current:  Goal met at 80% (4/22/21)  6) Goal: Patient will report ability to sleep through the night waking 1x or less for sufficient rest  Status at last note/certification:  Continues to have frequent pain at night (4/14/2021)   Current: goal met; some nights waking 0x, some 1x briefly (4/22/21)    RECOMMENDATIONS  Discontinue therapy due to lack of appreciable progress towards goals. Max gains made at this time and would benefit from intermittent con't but financially limited. If you have any questions/comments please contact us directly at [phone]. Thank you for allowing us to assist in the care of your patient.   Therapist Signature: Melina Mccollum DPT Date: 4/22/21     Time: 5:48 PM

## 2021-04-27 ENCOUNTER — APPOINTMENT (OUTPATIENT)
Dept: PHYSICAL THERAPY | Age: 61
End: 2021-04-27
Payer: COMMERCIAL

## 2021-04-29 ENCOUNTER — APPOINTMENT (OUTPATIENT)
Dept: PHYSICAL THERAPY | Age: 61
End: 2021-04-29
Payer: COMMERCIAL

## 2021-05-04 ENCOUNTER — APPOINTMENT (OUTPATIENT)
Dept: PHYSICAL THERAPY | Age: 61
End: 2021-05-04

## 2022-05-24 NOTE — PROGRESS NOTES
[address] [facility] [address] [facility] AT [address] [facility] [address] [place]  Phone: [phone] Fax: [phone]  PROGRESS NOTE  Patient Name: Mohsen Sy : 1960   Treatment/Medical Diagnosis: Chronic right shoulder pain [M25.511, G89.29]   Referral Source: María Starks MD     Date of Initial Visit: 2/15/2021 Attended Visits: 8 Missed Visits: 2     SUMMARY OF TREATMENT  Pt is a 62 y.o. F who presents with R shoulder pain. Signs and sx are consistent with adhesive capsulitis.  Current deficits include: significant ROM restrictions, dec functional strength, dec dynamic shoulder stability. Treatment Plan may include any combination of the following: Therapeutic exercise, Therapeutic activities, Neuromuscular re-education, Physical agent/modality, Gait/balance training, Manual therapy, Patient education, Self Care training, Functional mobility training, Home safety training and Stair training    CURRENT STATUS  Pt is making steady progress as noted by improvements in R shoulder AROM in all directions. She remains most limited by ER, IR, and abduction ranges. She is able to write on her chalkboard at school, but fatigues very easily. Pain has significantly improved, but does have occasional catches of pain. Plan to further promote shoulder ROM/mobility and progress strength training.  See     FOTO: 55/100  Subjective % improvement: 65-70%  Improvements: reduced pain levels - (no longer taking Tramadol), improved sleep, increased ROM   Deficits: reaching overhead, reaching across body, reaching behind back, lifting, lack of strength  Pain: (A) 4/10  (W) 8/10   Shoulder AROM (PROM): flex 122 (145)  Abd 78 (90 p!)   PERI to ear  (15-20 p!)  FIR lateral glut  (45)   Shoulder strength (through avaliable range): flex 3+/5, abd 3/5 , ER 3-/5, IR 3+/5       Short Term Goals: To be accomplished in 1 weeks:  1) Goal: Patient will be independent and compliant with HEP in order to progress toward long term goals. Status at last note/certification: issued and reviewed   Current: met - compliant with daily HEP (3/25/21)  Long Term Goals: To be accomplished in 8-12 treatments:  1) Goal: Patient will improve FOTO assessment score to 63 pts in order to indicate improved functional abilities. Status at last note/certification: 30 IDV  Current: progressing 55/100 (3/25/2021)  2) Goal: Patient will improve R shoulder flexion to 150 deg or better for overhead reaching  Status at last note/certification: 45 deg  Current: progressing - R shoulder flexion 122 degrees (3/25/2021)  3) Goal: Patient will report worst shoulder pain as 4/10 or less in order to progress toward personal goals. Status at last note/certification: 10/10  Current: Progressing - 8/10 worst pain (2/25/21)  4) Goal: Patient will report overall at least 75% improvement in function in order to progress toward premorbid status. Status at last note/certification: n/a  Current: nearly met- 65-70% improvement (3/25/2021)  6) Goal: Patient will report ability to sleep through the night waking 1x or less for sufficient rest  Status at last note/certification: wakes 3-4 times per night  Current: Continues to have frequent pain at night (3/9/2021)         New Goals to be achieved in __8__  treatments:  Cont to make progress towards all current goals    RECOMMENDATIONS  Cont 2x/wk for 8 additional sessions (insurance cap at 16 visits). If you have any questions/comments please contact us directly at [phone]   Thank you for allowing us to assist in the care of your patient. Therapist Signature: Malka Preston, PT Date: 3/25/2021   Reporting Period  na Time: 9:56 AM   NOTE TO PHYSICIAN:  PLEASE COMPLETE THE ORDERS BELOW AND FAX TO   [facility]: [phone].   If you are unable to process this request in 24 hours please contact our office: [phone].  ___ I have read the above report and request that my patient continue as recommended.   ___ I have read the above report and request that my patient continue therapy with the following changes/special instructions:_________________________________________________________   ___ I have read the above report and request that my patient be discharged from therapy.      Physician Signature:        Date:       Time:                                                        Timoteo Rodriguez MD. Plan: Sunscreen QD Render In Strict Bullet Format?: No Detail Level: Zone